# Patient Record
Sex: FEMALE | Race: WHITE | NOT HISPANIC OR LATINO
[De-identification: names, ages, dates, MRNs, and addresses within clinical notes are randomized per-mention and may not be internally consistent; named-entity substitution may affect disease eponyms.]

---

## 2017-04-27 ENCOUNTER — APPOINTMENT (OUTPATIENT)
Dept: HEMATOLOGY ONCOLOGY | Facility: CLINIC | Age: 82
End: 2017-04-27

## 2017-05-25 ENCOUNTER — EMERGENCY (EMERGENCY)
Facility: HOSPITAL | Age: 82
LOS: 0 days | Discharge: HOME | End: 2017-05-25

## 2017-06-28 DIAGNOSIS — I25.10 ATHEROSCLEROTIC HEART DISEASE OF NATIVE CORONARY ARTERY WITHOUT ANGINA PECTORIS: ICD-10-CM

## 2017-06-28 DIAGNOSIS — R06.02 SHORTNESS OF BREATH: ICD-10-CM

## 2017-06-28 DIAGNOSIS — R60.0 LOCALIZED EDEMA: ICD-10-CM

## 2017-06-28 DIAGNOSIS — I50.9 HEART FAILURE, UNSPECIFIED: ICD-10-CM

## 2017-06-28 DIAGNOSIS — Z88.0 ALLERGY STATUS TO PENICILLIN: ICD-10-CM

## 2017-06-28 DIAGNOSIS — Z88.1 ALLERGY STATUS TO OTHER ANTIBIOTIC AGENTS STATUS: ICD-10-CM

## 2017-06-28 DIAGNOSIS — I10 ESSENTIAL (PRIMARY) HYPERTENSION: ICD-10-CM

## 2017-06-28 DIAGNOSIS — Z88.5 ALLERGY STATUS TO NARCOTIC AGENT: ICD-10-CM

## 2017-06-28 DIAGNOSIS — Z90.710 ACQUIRED ABSENCE OF BOTH CERVIX AND UTERUS: ICD-10-CM

## 2017-06-28 DIAGNOSIS — Z79.01 LONG TERM (CURRENT) USE OF ANTICOAGULANTS: ICD-10-CM

## 2017-06-28 DIAGNOSIS — E03.9 HYPOTHYROIDISM, UNSPECIFIED: ICD-10-CM

## 2017-06-28 DIAGNOSIS — E11.9 TYPE 2 DIABETES MELLITUS WITHOUT COMPLICATIONS: ICD-10-CM

## 2017-09-15 ENCOUNTER — INPATIENT (INPATIENT)
Facility: HOSPITAL | Age: 82
LOS: 3 days | Discharge: HOME | End: 2017-09-19
Attending: INTERNAL MEDICINE

## 2017-09-15 DIAGNOSIS — N39.0 URINARY TRACT INFECTION, SITE NOT SPECIFIED: ICD-10-CM

## 2017-09-15 DIAGNOSIS — M31.6 OTHER GIANT CELL ARTERITIS: ICD-10-CM

## 2017-09-15 DIAGNOSIS — E11.9 TYPE 2 DIABETES MELLITUS WITHOUT COMPLICATIONS: ICD-10-CM

## 2017-09-15 DIAGNOSIS — E03.9 HYPOTHYROIDISM, UNSPECIFIED: ICD-10-CM

## 2017-09-15 DIAGNOSIS — T78.40XA ALLERGY, UNSPECIFIED, INITIAL ENCOUNTER: ICD-10-CM

## 2017-09-15 DIAGNOSIS — L03.90 CELLULITIS, UNSPECIFIED: ICD-10-CM

## 2017-09-15 DIAGNOSIS — I82.409 ACUTE EMBOLISM AND THROMBOSIS OF UNSPECIFIED DEEP VEINS OF UNSPECIFIED LOWER EXTREMITY: ICD-10-CM

## 2017-09-21 DIAGNOSIS — Z85.3 PERSONAL HISTORY OF MALIGNANT NEOPLASM OF BREAST: ICD-10-CM

## 2017-09-21 DIAGNOSIS — M31.6 OTHER GIANT CELL ARTERITIS: ICD-10-CM

## 2017-09-21 DIAGNOSIS — H54.62 UNQUALIFIED VISUAL LOSS, LEFT EYE, NORMAL VISION RIGHT EYE: ICD-10-CM

## 2017-09-21 DIAGNOSIS — Z94.7 CORNEAL TRANSPLANT STATUS: ICD-10-CM

## 2017-09-21 DIAGNOSIS — R26.9 UNSPECIFIED ABNORMALITIES OF GAIT AND MOBILITY: ICD-10-CM

## 2017-09-21 DIAGNOSIS — E11.21 TYPE 2 DIABETES MELLITUS WITH DIABETIC NEPHROPATHY: ICD-10-CM

## 2017-09-21 DIAGNOSIS — Z90.710 ACQUIRED ABSENCE OF BOTH CERVIX AND UTERUS: ICD-10-CM

## 2017-09-21 DIAGNOSIS — M19.90 UNSPECIFIED OSTEOARTHRITIS, UNSPECIFIED SITE: ICD-10-CM

## 2017-09-21 DIAGNOSIS — I25.2 OLD MYOCARDIAL INFARCTION: ICD-10-CM

## 2017-09-21 DIAGNOSIS — I25.10 ATHEROSCLEROTIC HEART DISEASE OF NATIVE CORONARY ARTERY WITHOUT ANGINA PECTORIS: ICD-10-CM

## 2017-09-21 DIAGNOSIS — Z88.2 ALLERGY STATUS TO SULFONAMIDES: ICD-10-CM

## 2017-09-21 DIAGNOSIS — I50.9 HEART FAILURE, UNSPECIFIED: ICD-10-CM

## 2017-09-21 DIAGNOSIS — I11.0 HYPERTENSIVE HEART DISEASE WITH HEART FAILURE: ICD-10-CM

## 2017-09-21 DIAGNOSIS — Z87.440 PERSONAL HISTORY OF URINARY (TRACT) INFECTIONS: ICD-10-CM

## 2017-09-21 DIAGNOSIS — Z79.01 LONG TERM (CURRENT) USE OF ANTICOAGULANTS: ICD-10-CM

## 2017-09-21 DIAGNOSIS — Z86.718 PERSONAL HISTORY OF OTHER VENOUS THROMBOSIS AND EMBOLISM: ICD-10-CM

## 2017-09-21 DIAGNOSIS — Z86.73 PERSONAL HISTORY OF TRANSIENT ISCHEMIC ATTACK (TIA), AND CEREBRAL INFARCTION WITHOUT RESIDUAL DEFICITS: ICD-10-CM

## 2017-09-21 DIAGNOSIS — E03.9 HYPOTHYROIDISM, UNSPECIFIED: ICD-10-CM

## 2017-09-21 DIAGNOSIS — Z88.0 ALLERGY STATUS TO PENICILLIN: ICD-10-CM

## 2017-09-21 DIAGNOSIS — I48.91 UNSPECIFIED ATRIAL FIBRILLATION: ICD-10-CM

## 2017-09-25 DIAGNOSIS — M31.6 OTHER GIANT CELL ARTERITIS: ICD-10-CM

## 2017-11-23 ENCOUNTER — INPATIENT (INPATIENT)
Facility: HOSPITAL | Age: 82
LOS: 7 days | Discharge: NURSING, REHAB OR LONG-TERM CARE | End: 2017-12-01
Attending: HOSPITALIST

## 2017-11-23 DIAGNOSIS — E03.9 HYPOTHYROIDISM, UNSPECIFIED: ICD-10-CM

## 2017-11-23 DIAGNOSIS — T78.40XA ALLERGY, UNSPECIFIED, INITIAL ENCOUNTER: ICD-10-CM

## 2017-11-23 DIAGNOSIS — N39.0 URINARY TRACT INFECTION, SITE NOT SPECIFIED: ICD-10-CM

## 2017-11-23 DIAGNOSIS — L03.90 CELLULITIS, UNSPECIFIED: ICD-10-CM

## 2017-11-23 DIAGNOSIS — M31.6 OTHER GIANT CELL ARTERITIS: ICD-10-CM

## 2017-11-23 DIAGNOSIS — I82.409 ACUTE EMBOLISM AND THROMBOSIS OF UNSPECIFIED DEEP VEINS OF UNSPECIFIED LOWER EXTREMITY: ICD-10-CM

## 2017-11-23 DIAGNOSIS — E11.9 TYPE 2 DIABETES MELLITUS WITHOUT COMPLICATIONS: ICD-10-CM

## 2017-12-01 ENCOUNTER — INPATIENT (INPATIENT)
Facility: HOSPITAL | Age: 82
LOS: 19 days | Discharge: HOME | End: 2017-12-21
Attending: PHYSICAL MEDICINE & REHABILITATION

## 2017-12-01 DIAGNOSIS — L03.90 CELLULITIS, UNSPECIFIED: ICD-10-CM

## 2017-12-01 DIAGNOSIS — E11.9 TYPE 2 DIABETES MELLITUS WITHOUT COMPLICATIONS: ICD-10-CM

## 2017-12-01 DIAGNOSIS — M31.6 OTHER GIANT CELL ARTERITIS: ICD-10-CM

## 2017-12-01 DIAGNOSIS — T78.40XA ALLERGY, UNSPECIFIED, INITIAL ENCOUNTER: ICD-10-CM

## 2017-12-01 DIAGNOSIS — I82.409 ACUTE EMBOLISM AND THROMBOSIS OF UNSPECIFIED DEEP VEINS OF UNSPECIFIED LOWER EXTREMITY: ICD-10-CM

## 2017-12-01 DIAGNOSIS — E03.9 HYPOTHYROIDISM, UNSPECIFIED: ICD-10-CM

## 2017-12-01 DIAGNOSIS — N39.0 URINARY TRACT INFECTION, SITE NOT SPECIFIED: ICD-10-CM

## 2017-12-05 DIAGNOSIS — I11.0 HYPERTENSIVE HEART DISEASE WITH HEART FAILURE: ICD-10-CM

## 2017-12-05 DIAGNOSIS — Z86.718 PERSONAL HISTORY OF OTHER VENOUS THROMBOSIS AND EMBOLISM: ICD-10-CM

## 2017-12-05 DIAGNOSIS — M19.90 UNSPECIFIED OSTEOARTHRITIS, UNSPECIFIED SITE: ICD-10-CM

## 2017-12-05 DIAGNOSIS — I48.91 UNSPECIFIED ATRIAL FIBRILLATION: ICD-10-CM

## 2017-12-05 DIAGNOSIS — Z79.01 LONG TERM (CURRENT) USE OF ANTICOAGULANTS: ICD-10-CM

## 2017-12-05 DIAGNOSIS — R79.1 ABNORMAL COAGULATION PROFILE: ICD-10-CM

## 2017-12-05 DIAGNOSIS — E11.59 TYPE 2 DIABETES MELLITUS WITH OTHER CIRCULATORY COMPLICATIONS: ICD-10-CM

## 2017-12-05 DIAGNOSIS — H91.90 UNSPECIFIED HEARING LOSS, UNSPECIFIED EAR: ICD-10-CM

## 2017-12-05 DIAGNOSIS — T50.2X5A ADVERSE EFFECT OF CARBONIC-ANHYDRASE INHIBITORS, BENZOTHIADIAZIDES AND OTHER DIURETICS, INITIAL ENCOUNTER: ICD-10-CM

## 2017-12-05 DIAGNOSIS — R21 RASH AND OTHER NONSPECIFIC SKIN ERUPTION: ICD-10-CM

## 2017-12-05 DIAGNOSIS — Z88.0 ALLERGY STATUS TO PENICILLIN: ICD-10-CM

## 2017-12-05 DIAGNOSIS — Z86.73 PERSONAL HISTORY OF TRANSIENT ISCHEMIC ATTACK (TIA), AND CEREBRAL INFARCTION WITHOUT RESIDUAL DEFICITS: ICD-10-CM

## 2017-12-05 DIAGNOSIS — E03.9 HYPOTHYROIDISM, UNSPECIFIED: ICD-10-CM

## 2017-12-05 DIAGNOSIS — Z87.440 PERSONAL HISTORY OF URINARY (TRACT) INFECTIONS: ICD-10-CM

## 2017-12-05 DIAGNOSIS — I50.32 CHRONIC DIASTOLIC (CONGESTIVE) HEART FAILURE: ICD-10-CM

## 2017-12-05 DIAGNOSIS — E11.40 TYPE 2 DIABETES MELLITUS WITH DIABETIC NEUROPATHY, UNSPECIFIED: ICD-10-CM

## 2017-12-05 DIAGNOSIS — Z85.3 PERSONAL HISTORY OF MALIGNANT NEOPLASM OF BREAST: ICD-10-CM

## 2017-12-05 DIAGNOSIS — I25.10 ATHEROSCLEROTIC HEART DISEASE OF NATIVE CORONARY ARTERY WITHOUT ANGINA PECTORIS: ICD-10-CM

## 2017-12-05 DIAGNOSIS — Z51.89 ENCOUNTER FOR OTHER SPECIFIED AFTERCARE: ICD-10-CM

## 2017-12-05 DIAGNOSIS — R19.7 DIARRHEA, UNSPECIFIED: ICD-10-CM

## 2017-12-05 DIAGNOSIS — L03.115 CELLULITIS OF RIGHT LOWER LIMB: ICD-10-CM

## 2017-12-05 DIAGNOSIS — M31.6 OTHER GIANT CELL ARTERITIS: ICD-10-CM

## 2017-12-05 DIAGNOSIS — L89.153 PRESSURE ULCER OF SACRAL REGION, STAGE 3: ICD-10-CM

## 2017-12-05 DIAGNOSIS — E83.42 HYPOMAGNESEMIA: ICD-10-CM

## 2017-12-05 DIAGNOSIS — E87.6 HYPOKALEMIA: ICD-10-CM

## 2017-12-05 DIAGNOSIS — Z90.710 ACQUIRED ABSENCE OF BOTH CERVIX AND UTERUS: ICD-10-CM

## 2017-12-05 DIAGNOSIS — Z79.84 LONG TERM (CURRENT) USE OF ORAL HYPOGLYCEMIC DRUGS: ICD-10-CM

## 2017-12-05 DIAGNOSIS — E87.3 ALKALOSIS: ICD-10-CM

## 2017-12-18 ENCOUNTER — APPOINTMENT (OUTPATIENT)
Dept: OTOLARYNGOLOGY | Facility: CLINIC | Age: 82
End: 2017-12-18

## 2017-12-26 ENCOUNTER — EMERGENCY (EMERGENCY)
Facility: HOSPITAL | Age: 82
LOS: 0 days | Discharge: HOME | End: 2017-12-27

## 2017-12-26 DIAGNOSIS — I50.9 HEART FAILURE, UNSPECIFIED: ICD-10-CM

## 2017-12-26 DIAGNOSIS — L03.90 CELLULITIS, UNSPECIFIED: ICD-10-CM

## 2017-12-26 DIAGNOSIS — Z88.5 ALLERGY STATUS TO NARCOTIC AGENT: ICD-10-CM

## 2017-12-26 DIAGNOSIS — D64.9 ANEMIA, UNSPECIFIED: ICD-10-CM

## 2017-12-26 DIAGNOSIS — R53.1 WEAKNESS: ICD-10-CM

## 2017-12-26 DIAGNOSIS — I48.91 UNSPECIFIED ATRIAL FIBRILLATION: ICD-10-CM

## 2017-12-26 DIAGNOSIS — I82.409 ACUTE EMBOLISM AND THROMBOSIS OF UNSPECIFIED DEEP VEINS OF UNSPECIFIED LOWER EXTREMITY: ICD-10-CM

## 2017-12-26 DIAGNOSIS — I25.2 OLD MYOCARDIAL INFARCTION: ICD-10-CM

## 2017-12-26 DIAGNOSIS — E03.9 HYPOTHYROIDISM, UNSPECIFIED: ICD-10-CM

## 2017-12-26 DIAGNOSIS — Z90.49 ACQUIRED ABSENCE OF OTHER SPECIFIED PARTS OF DIGESTIVE TRACT: ICD-10-CM

## 2017-12-26 DIAGNOSIS — T78.40XA ALLERGY, UNSPECIFIED, INITIAL ENCOUNTER: ICD-10-CM

## 2017-12-26 DIAGNOSIS — R05 COUGH: ICD-10-CM

## 2017-12-26 DIAGNOSIS — R30.0 DYSURIA: ICD-10-CM

## 2017-12-26 DIAGNOSIS — E11.9 TYPE 2 DIABETES MELLITUS WITHOUT COMPLICATIONS: ICD-10-CM

## 2017-12-26 DIAGNOSIS — Z79.899 OTHER LONG TERM (CURRENT) DRUG THERAPY: ICD-10-CM

## 2017-12-26 DIAGNOSIS — I11.0 HYPERTENSIVE HEART DISEASE WITH HEART FAILURE: ICD-10-CM

## 2017-12-26 DIAGNOSIS — M31.6 OTHER GIANT CELL ARTERITIS: ICD-10-CM

## 2017-12-26 DIAGNOSIS — N39.0 URINARY TRACT INFECTION, SITE NOT SPECIFIED: ICD-10-CM

## 2017-12-27 DIAGNOSIS — Z86.718 PERSONAL HISTORY OF OTHER VENOUS THROMBOSIS AND EMBOLISM: ICD-10-CM

## 2017-12-27 DIAGNOSIS — J11.1 INFLUENZA DUE TO UNIDENTIFIED INFLUENZA VIRUS WITH OTHER RESPIRATORY MANIFESTATIONS: ICD-10-CM

## 2017-12-27 DIAGNOSIS — T38.0X5D ADVERSE EFFECT OF GLUCOCORTICOIDS AND SYNTHETIC ANALOGUES, SUBSEQUENT ENCOUNTER: ICD-10-CM

## 2017-12-27 DIAGNOSIS — Z79.84 LONG TERM (CURRENT) USE OF ORAL HYPOGLYCEMIC DRUGS: ICD-10-CM

## 2017-12-27 DIAGNOSIS — G72.0 DRUG-INDUCED MYOPATHY: ICD-10-CM

## 2017-12-27 DIAGNOSIS — E03.9 HYPOTHYROIDISM, UNSPECIFIED: ICD-10-CM

## 2017-12-27 DIAGNOSIS — I48.91 UNSPECIFIED ATRIAL FIBRILLATION: ICD-10-CM

## 2017-12-27 DIAGNOSIS — Z87.440 PERSONAL HISTORY OF URINARY (TRACT) INFECTIONS: ICD-10-CM

## 2017-12-27 DIAGNOSIS — R53.81 OTHER MALAISE: ICD-10-CM

## 2017-12-27 DIAGNOSIS — Z90.10 ACQUIRED ABSENCE OF UNSPECIFIED BREAST AND NIPPLE: ICD-10-CM

## 2017-12-27 DIAGNOSIS — I25.10 ATHEROSCLEROTIC HEART DISEASE OF NATIVE CORONARY ARTERY WITHOUT ANGINA PECTORIS: ICD-10-CM

## 2017-12-27 DIAGNOSIS — Z86.73 PERSONAL HISTORY OF TRANSIENT ISCHEMIC ATTACK (TIA), AND CEREBRAL INFARCTION WITHOUT RESIDUAL DEFICITS: ICD-10-CM

## 2017-12-27 DIAGNOSIS — E11.40 TYPE 2 DIABETES MELLITUS WITH DIABETIC NEUROPATHY, UNSPECIFIED: ICD-10-CM

## 2017-12-27 DIAGNOSIS — I50.9 HEART FAILURE, UNSPECIFIED: ICD-10-CM

## 2017-12-27 DIAGNOSIS — B94.8 SEQUELAE OF OTHER SPECIFIED INFECTIOUS AND PARASITIC DISEASES: ICD-10-CM

## 2017-12-27 DIAGNOSIS — Z79.01 LONG TERM (CURRENT) USE OF ANTICOAGULANTS: ICD-10-CM

## 2017-12-27 DIAGNOSIS — I42.9 CARDIOMYOPATHY, UNSPECIFIED: ICD-10-CM

## 2017-12-27 DIAGNOSIS — N81.10 CYSTOCELE, UNSPECIFIED: ICD-10-CM

## 2017-12-27 DIAGNOSIS — L03.115 CELLULITIS OF RIGHT LOWER LIMB: ICD-10-CM

## 2017-12-27 DIAGNOSIS — M31.6 OTHER GIANT CELL ARTERITIS: ICD-10-CM

## 2017-12-27 DIAGNOSIS — R79.1 ABNORMAL COAGULATION PROFILE: ICD-10-CM

## 2017-12-27 DIAGNOSIS — D64.9 ANEMIA, UNSPECIFIED: ICD-10-CM

## 2017-12-27 DIAGNOSIS — Z85.3 PERSONAL HISTORY OF MALIGNANT NEOPLASM OF BREAST: ICD-10-CM

## 2017-12-27 DIAGNOSIS — Z79.52 LONG TERM (CURRENT) USE OF SYSTEMIC STEROIDS: ICD-10-CM

## 2018-01-18 ENCOUNTER — INPATIENT (INPATIENT)
Facility: HOSPITAL | Age: 83
LOS: 5 days | Discharge: HOME HEALTH | End: 2018-01-24
Attending: INTERNAL MEDICINE

## 2018-01-29 DIAGNOSIS — R53.81 OTHER MALAISE: ICD-10-CM

## 2018-01-29 DIAGNOSIS — B96.5 PSEUDOMONAS (AERUGINOSA) (MALLEI) (PSEUDOMALLEI) AS THE CAUSE OF DISEASES CLASSIFIED ELSEWHERE: ICD-10-CM

## 2018-01-29 DIAGNOSIS — E11.40 TYPE 2 DIABETES MELLITUS WITH DIABETIC NEUROPATHY, UNSPECIFIED: ICD-10-CM

## 2018-01-29 DIAGNOSIS — M31.6 OTHER GIANT CELL ARTERITIS: ICD-10-CM

## 2018-01-29 DIAGNOSIS — Z86.718 PERSONAL HISTORY OF OTHER VENOUS THROMBOSIS AND EMBOLISM: ICD-10-CM

## 2018-01-29 DIAGNOSIS — Z90.710 ACQUIRED ABSENCE OF BOTH CERVIX AND UTERUS: ICD-10-CM

## 2018-01-29 DIAGNOSIS — T45.515A ADVERSE EFFECT OF ANTICOAGULANTS, INITIAL ENCOUNTER: ICD-10-CM

## 2018-01-29 DIAGNOSIS — R15.9 FULL INCONTINENCE OF FECES: ICD-10-CM

## 2018-01-29 DIAGNOSIS — E87.6 HYPOKALEMIA: ICD-10-CM

## 2018-01-29 DIAGNOSIS — E03.9 HYPOTHYROIDISM, UNSPECIFIED: ICD-10-CM

## 2018-01-29 DIAGNOSIS — Z88.0 ALLERGY STATUS TO PENICILLIN: ICD-10-CM

## 2018-01-29 DIAGNOSIS — Z86.73 PERSONAL HISTORY OF TRANSIENT ISCHEMIC ATTACK (TIA), AND CEREBRAL INFARCTION WITHOUT RESIDUAL DEFICITS: ICD-10-CM

## 2018-01-29 DIAGNOSIS — Z85.3 PERSONAL HISTORY OF MALIGNANT NEOPLASM OF BREAST: ICD-10-CM

## 2018-01-29 DIAGNOSIS — N39.0 URINARY TRACT INFECTION, SITE NOT SPECIFIED: ICD-10-CM

## 2018-01-29 DIAGNOSIS — D64.9 ANEMIA, UNSPECIFIED: ICD-10-CM

## 2018-01-29 DIAGNOSIS — Z66 DO NOT RESUSCITATE: ICD-10-CM

## 2018-01-29 DIAGNOSIS — Z79.01 LONG TERM (CURRENT) USE OF ANTICOAGULANTS: ICD-10-CM

## 2018-01-29 DIAGNOSIS — I48.91 UNSPECIFIED ATRIAL FIBRILLATION: ICD-10-CM

## 2018-01-29 DIAGNOSIS — I25.10 ATHEROSCLEROTIC HEART DISEASE OF NATIVE CORONARY ARTERY WITHOUT ANGINA PECTORIS: ICD-10-CM

## 2018-01-29 DIAGNOSIS — I50.9 HEART FAILURE, UNSPECIFIED: ICD-10-CM

## 2018-01-29 DIAGNOSIS — Z87.440 PERSONAL HISTORY OF URINARY (TRACT) INFECTIONS: ICD-10-CM

## 2018-01-29 DIAGNOSIS — I25.2 OLD MYOCARDIAL INFARCTION: ICD-10-CM

## 2018-01-29 DIAGNOSIS — I11.0 HYPERTENSIVE HEART DISEASE WITH HEART FAILURE: ICD-10-CM

## 2018-01-29 DIAGNOSIS — D68.9 COAGULATION DEFECT, UNSPECIFIED: ICD-10-CM

## 2018-01-29 DIAGNOSIS — E11.59 TYPE 2 DIABETES MELLITUS WITH OTHER CIRCULATORY COMPLICATIONS: ICD-10-CM

## 2018-02-02 ENCOUNTER — INPATIENT (INPATIENT)
Facility: HOSPITAL | Age: 83
LOS: 6 days | Discharge: HOME HEALTH | End: 2018-02-09
Attending: HOSPITALIST

## 2018-02-03 VITALS — HEIGHT: 61 IN | WEIGHT: 165.35 LBS

## 2018-02-03 LAB
ANION GAP SERPL CALC-SCNC: 7 MMOL/L — SIGNIFICANT CHANGE UP (ref 7–14)
BUN SERPL-MCNC: 7 MG/DL — LOW (ref 10–20)
CALCIUM SERPL-MCNC: 7.1 MG/DL — LOW (ref 8.5–10.1)
CHLORIDE SERPL-SCNC: 102 MMOL/L — SIGNIFICANT CHANGE UP (ref 98–110)
CO2 SERPL-SCNC: 27 MMOL/L — SIGNIFICANT CHANGE UP (ref 17–32)
CREAT SERPL-MCNC: 0.6 MG/DL — LOW (ref 0.7–1.5)
GLUCOSE SERPL-MCNC: 137 MG/DL — HIGH (ref 70–110)
INR BLD: 2.41 RATIO — HIGH (ref 0.65–1.3)
POTASSIUM SERPL-MCNC: 3.2 MMOL/L — LOW (ref 3.5–5)
POTASSIUM SERPL-SCNC: 3.2 MMOL/L — LOW (ref 3.5–5)
PROTHROM AB SERPL-ACNC: 26.5 SEC — HIGH (ref 9.95–12.87)
SODIUM SERPL-SCNC: 136 MMOL/L — SIGNIFICANT CHANGE UP (ref 135–146)

## 2018-02-03 RX ORDER — INSULIN GLARGINE 100 [IU]/ML
10 INJECTION, SOLUTION SUBCUTANEOUS AT BEDTIME
Qty: 0 | Refills: 0 | Status: DISCONTINUED | OUTPATIENT
Start: 2018-02-03 | End: 2018-02-09

## 2018-02-03 RX ORDER — DEXTROSE 50 % IN WATER 50 %
1 SYRINGE (ML) INTRAVENOUS ONCE
Qty: 0 | Refills: 0 | Status: DISCONTINUED | OUTPATIENT
Start: 2018-02-03 | End: 2018-02-09

## 2018-02-03 RX ORDER — CEFEPIME 1 G/1
2000 INJECTION, POWDER, FOR SOLUTION INTRAMUSCULAR; INTRAVENOUS EVERY 12 HOURS
Qty: 0 | Refills: 0 | Status: DISCONTINUED | OUTPATIENT
Start: 2018-02-03 | End: 2018-02-03

## 2018-02-03 RX ORDER — CEFEPIME 1 G/1
2000 INJECTION, POWDER, FOR SOLUTION INTRAMUSCULAR; INTRAVENOUS EVERY 12 HOURS
Qty: 0 | Refills: 0 | Status: DISCONTINUED | OUTPATIENT
Start: 2018-02-03 | End: 2018-02-06

## 2018-02-03 RX ORDER — INSULIN LISPRO 100/ML
VIAL (ML) SUBCUTANEOUS
Qty: 0 | Refills: 0 | Status: DISCONTINUED | OUTPATIENT
Start: 2018-02-03 | End: 2018-02-09

## 2018-02-03 RX ORDER — DEXTROSE 50 % IN WATER 50 %
50 SYRINGE (ML) INTRAVENOUS ONCE
Qty: 0 | Refills: 0 | Status: DISCONTINUED | OUTPATIENT
Start: 2018-02-03 | End: 2018-02-09

## 2018-02-03 RX ORDER — PANTOPRAZOLE SODIUM 20 MG/1
40 TABLET, DELAYED RELEASE ORAL
Qty: 0 | Refills: 0 | Status: DISCONTINUED | OUTPATIENT
Start: 2018-02-03 | End: 2018-02-09

## 2018-02-03 RX ORDER — ACETAMINOPHEN 500 MG
650 TABLET ORAL EVERY 4 HOURS
Qty: 0 | Refills: 0 | Status: DISCONTINUED | OUTPATIENT
Start: 2018-02-03 | End: 2018-02-09

## 2018-02-03 RX ORDER — LEVOTHYROXINE SODIUM 125 MCG
25 TABLET ORAL DAILY
Qty: 0 | Refills: 0 | Status: DISCONTINUED | OUTPATIENT
Start: 2018-02-03 | End: 2018-02-09

## 2018-02-03 RX ORDER — GABAPENTIN 400 MG/1
100 CAPSULE ORAL DAILY
Qty: 0 | Refills: 0 | Status: DISCONTINUED | OUTPATIENT
Start: 2018-02-03 | End: 2018-02-09

## 2018-02-03 RX ORDER — CALCIUM CARBONATE 500(1250)
1 TABLET ORAL DAILY
Qty: 0 | Refills: 0 | Status: DISCONTINUED | OUTPATIENT
Start: 2018-02-03 | End: 2018-02-03

## 2018-02-03 RX ADMIN — CEFEPIME 100 MILLIGRAM(S): 1 INJECTION, POWDER, FOR SOLUTION INTRAMUSCULAR; INTRAVENOUS at 17:53

## 2018-02-03 RX ADMIN — GABAPENTIN 100 MILLIGRAM(S): 400 CAPSULE ORAL at 16:39

## 2018-02-04 DIAGNOSIS — E11.9 TYPE 2 DIABETES MELLITUS WITHOUT COMPLICATIONS: ICD-10-CM

## 2018-02-04 DIAGNOSIS — T78.40XA ALLERGY, UNSPECIFIED, INITIAL ENCOUNTER: ICD-10-CM

## 2018-02-04 DIAGNOSIS — L03.90 CELLULITIS, UNSPECIFIED: ICD-10-CM

## 2018-02-04 DIAGNOSIS — N39.0 URINARY TRACT INFECTION, SITE NOT SPECIFIED: ICD-10-CM

## 2018-02-04 DIAGNOSIS — I82.409 ACUTE EMBOLISM AND THROMBOSIS OF UNSPECIFIED DEEP VEINS OF UNSPECIFIED LOWER EXTREMITY: ICD-10-CM

## 2018-02-04 DIAGNOSIS — M31.6 OTHER GIANT CELL ARTERITIS: ICD-10-CM

## 2018-02-04 DIAGNOSIS — E03.9 HYPOTHYROIDISM, UNSPECIFIED: ICD-10-CM

## 2018-02-04 LAB
ANION GAP SERPL CALC-SCNC: 7 MMOL/L — SIGNIFICANT CHANGE UP (ref 7–14)
BUN SERPL-MCNC: 5 MG/DL — LOW (ref 10–20)
CALCIUM SERPL-MCNC: 7.3 MG/DL — LOW (ref 8.5–10.1)
CHLORIDE SERPL-SCNC: 104 MMOL/L — SIGNIFICANT CHANGE UP (ref 98–110)
CO2 SERPL-SCNC: 28 MMOL/L — SIGNIFICANT CHANGE UP (ref 17–32)
GLUCOSE SERPL-MCNC: 75 MG/DL — SIGNIFICANT CHANGE UP (ref 70–110)
HCT VFR BLD CALC: 24.6 % — LOW (ref 37–47)
HCT VFR BLD CALC: 25 % — LOW (ref 37–47)
HGB BLD-MCNC: 7.2 G/DL — CRITICAL LOW (ref 14–18)
HGB BLD-MCNC: 7.3 G/DL — CRITICAL LOW (ref 14–18)
INR BLD: 2.08 RATIO — HIGH (ref 0.65–1.3)
MCHC RBC-ENTMCNC: 24.3 PG — LOW (ref 27–31)
MCHC RBC-ENTMCNC: 24.7 PG — LOW (ref 27–31)
MCHC RBC-ENTMCNC: 29.2 G/DL — LOW (ref 32–37)
MCHC RBC-ENTMCNC: 29.3 G/DL — LOW (ref 32–37)
MCV RBC AUTO: 83.3 FL — SIGNIFICANT CHANGE UP (ref 81–91)
MCV RBC AUTO: 84.5 FL — SIGNIFICANT CHANGE UP (ref 81–91)
NRBC # BLD: 0 /100 WBCS — SIGNIFICANT CHANGE UP (ref 0–0)
NRBC # BLD: 0 /100 WBCS — SIGNIFICANT CHANGE UP (ref 0–0)
PLATELET # BLD AUTO: 368 K/UL — SIGNIFICANT CHANGE UP (ref 130–400)
PLATELET # BLD AUTO: 381 K/UL — SIGNIFICANT CHANGE UP (ref 130–400)
POTASSIUM SERPL-MCNC: 3.6 MMOL/L — SIGNIFICANT CHANGE UP (ref 3.5–5)
POTASSIUM SERPL-SCNC: 3.6 MMOL/L — SIGNIFICANT CHANGE UP (ref 3.5–5)
PROTHROM AB SERPL-ACNC: 22.8 SEC — HIGH (ref 9.95–12.87)
RBC # BLD: 2.91 M/UL — LOW (ref 4.2–5.4)
RBC # BLD: 3 M/UL — LOW (ref 4.2–5.4)
RBC # FLD: 16.2 % — HIGH (ref 11.5–14.5)
RBC # FLD: 16.3 % — HIGH (ref 11.5–14.5)
SODIUM SERPL-SCNC: 139 MMOL/L — SIGNIFICANT CHANGE UP (ref 135–146)
WBC # BLD: 5.01 K/UL — SIGNIFICANT CHANGE UP (ref 4.8–10.8)
WBC # BLD: 5.91 K/UL — SIGNIFICANT CHANGE UP (ref 4.8–10.8)
WBC # FLD AUTO: 5.01 K/UL — SIGNIFICANT CHANGE UP (ref 4.8–10.8)
WBC # FLD AUTO: 5.91 K/UL — SIGNIFICANT CHANGE UP (ref 4.8–10.8)

## 2018-02-04 RX ORDER — WARFARIN SODIUM 2.5 MG/1
2 TABLET ORAL ONCE
Qty: 0 | Refills: 0 | Status: COMPLETED | OUTPATIENT
Start: 2018-02-04 | End: 2018-02-04

## 2018-02-04 RX ORDER — ACETAMINOPHEN 500 MG
650 TABLET ORAL EVERY 6 HOURS
Qty: 0 | Refills: 0 | Status: DISCONTINUED | OUTPATIENT
Start: 2018-02-04 | End: 2018-02-09

## 2018-02-04 RX ORDER — POTASSIUM CHLORIDE 20 MEQ
20 PACKET (EA) ORAL ONCE
Qty: 0 | Refills: 0 | Status: COMPLETED | OUTPATIENT
Start: 2018-02-04 | End: 2018-02-04

## 2018-02-04 RX ADMIN — Medication 2.5 MILLIGRAM(S): at 05:27

## 2018-02-04 RX ADMIN — Medication 20 MILLIEQUIVALENT(S): at 11:07

## 2018-02-04 RX ADMIN — Medication 1 TABLET(S): at 11:02

## 2018-02-04 RX ADMIN — Medication 25 MICROGRAM(S): at 05:27

## 2018-02-04 RX ADMIN — CEFEPIME 100 MILLIGRAM(S): 1 INJECTION, POWDER, FOR SOLUTION INTRAMUSCULAR; INTRAVENOUS at 05:34

## 2018-02-04 RX ADMIN — WARFARIN SODIUM 2 MILLIGRAM(S): 2.5 TABLET ORAL at 21:28

## 2018-02-04 RX ADMIN — Medication 650 MILLIGRAM(S): at 18:08

## 2018-02-04 RX ADMIN — GABAPENTIN 100 MILLIGRAM(S): 400 CAPSULE ORAL at 11:02

## 2018-02-04 NOTE — PROGRESS NOTE ADULT - SUBJECTIVE AND OBJECTIVE BOX
RACIEL MILLIGAN [id number]    Hospitalist Note  89yFemale with PMHx Recurrent UTI, DVT on Coumadin, Temporal Arteritis, TIA, DMII with neuropathy, Lumpectomy, Bladder Prolapse with prior pessary, CAD status post MI, Hypothyroidism, and Breast CA status post lumpectomy admitted for dysuria and fevers secondary to recurrent UTI.    Overnight events/Updates: The pt developed acute on chronic anemia over the past 24 hours.  No melena or hematochezia was reported.  This was likely related to Coumadin toxicity.  Repeat CBC this afternoon confirmed persistent anemia.    Vital Signs Last 24 Hrs  T(C): 37.6 (04 Feb 2018 11:58), Max: 37.6 (04 Feb 2018 11:58)  T(F): 99.7 (04 Feb 2018 11:58), Max: 99.7 (04 Feb 2018 11:58)  HR: 96 (04 Feb 2018 11:58) (82 - 96)  BP: 99/46 (04 Feb 2018 11:58) (99/46 - 113/49)   BP(mean): --  RR: 18 (04 Feb 2018 05:39) (18 - 18)  SpO2: --    Physical Examination:  General: AAO x3   HEENT: PERRLA, EOMI  CV= S1 & S2 appreciated  Lungs=CTA BL  Abdominal Examination= + BS Soft NT/ND  Stage II decubitus ulcer to the sacrum  Extremity Examination= No C/C/E    ROS: No chest pain, no shortness of breath.  All other systems reviewed and are wtihin normal limits except for the complaints in the HPI.    MEDICATIONS  (STANDING):  calcium carbonate 1250 mG + Vitamin D (OsCal 500 + D) 1 Tablet(s) Oral daily  cefepime  IVPB 2000 milliGRAM(s) IV Intermittent every 12 hours  gabapentin 100 milliGRAM(s) Oral daily  insulin glargine Injectable (LANTUS) 10 Unit(s) SubCutaneous at bedtime  insulin lispro (HumaLOG) corrective regimen sliding scale   SubCutaneous three times a day before meals  levothyroxine 25 MICROGram(s) Oral daily  pantoprazole    Tablet 40 milliGRAM(s) Oral before breakfast  predniSONE   Tablet 2.5 milliGRAM(s) Oral daily    MEDICATIONS  (PRN):  acetaminophen   Tablet 650 milliGRAM(s) Oral every 4 hours PRN For Temp greater than 38.3 C (101F)  dextrose 50% Injectable 50 milliLiter(s) IV Push once PRN Blood Glucose at or below 70mg/dl and pt cannot take po - give stat and notify MD after giving medication for hypoglycemia  dextrose Gel 1 Dose(s) Oral once PRN Blood Glucose LESS THAN 70 milliGRAM(s)/deciLiter                            7.3    5.91  )-----------( 381      ( 04 Feb 2018 11:44 )             25.0     02-04    139  |  104  |  5<L>  ----------------------------<  75  3.6   |  28  |  x     Ca    7.3<L>      04 Feb 2018 05:15        Case discussed with housestaff & family  CARLOS Angel [id number]

## 2018-02-04 NOTE — PROGRESS NOTE ADULT - ASSESSMENT
89yo Female with PMHx Recurrent UTI, DVT on Coumadin, Temporal Arteritis, TIA, DMII with neuropathy, Lumpectomy, Bladder Prolapse with prior pessary, CAD status post MI, Hypothyroidism, and Breast CA status post lumpectomy admitted for dysuria and fevers secondary to recurrent UTI.    1) Recurrent UTI: pt suffered recent allergy to Cipro.  Avoid fluoroquinolones.  She has been tolerating Cefepime without complaint.  No fevers reported.  Follow-up results from repeat urine and blood cultures.  Previous urine cultures were positive for sensitive Klebsiella.  2) Hx of Bladder Prolapse: uro-gynecology consult requested for history of bladder prolapse and recurrent urinary tract infections.  3) DVT on Coumadin/Coumadin Toxicity: complicated by acute blood loss anemia.  Repeat Hgb stable @ 7.3.  Type and screen ordered.  Transfuse 1u PRBC and repeat CBC for tomorrow morning.  4) Temporal Arteritis: continue Prednisone 2.5mg PO q24  5) DMII: continue Lantus/lIspro as directed.  Monitor FS with meals.  For peripheral neuropathy, continue Neurontin.  6) Stage II: continue local wound care as per nursing  6) GI/DVT prophylaxis

## 2018-02-05 DIAGNOSIS — Z01.89 ENCOUNTER FOR OTHER SPECIFIED SPECIAL EXAMINATIONS: ICD-10-CM

## 2018-02-05 DIAGNOSIS — D50.0 IRON DEFICIENCY ANEMIA SECONDARY TO BLOOD LOSS (CHRONIC): ICD-10-CM

## 2018-02-05 DIAGNOSIS — N39.0 URINARY TRACT INFECTION, SITE NOT SPECIFIED: ICD-10-CM

## 2018-02-05 DIAGNOSIS — N81.10 CYSTOCELE, UNSPECIFIED: ICD-10-CM

## 2018-02-05 DIAGNOSIS — T45.511S: ICD-10-CM

## 2018-02-05 DIAGNOSIS — M31.6 OTHER GIANT CELL ARTERITIS: ICD-10-CM

## 2018-02-05 LAB
ANION GAP SERPL CALC-SCNC: 10 MMOL/L — SIGNIFICANT CHANGE UP (ref 7–14)
ANION GAP SERPL CALC-SCNC: 3 MMOL/L — LOW (ref 7–14)
BUN SERPL-MCNC: 6 MG/DL — LOW (ref 10–20)
BUN SERPL-MCNC: 6 MG/DL — LOW (ref 10–20)
CALCIUM SERPL-MCNC: 7.4 MG/DL — LOW (ref 8.5–10.1)
CALCIUM SERPL-MCNC: 7.6 MG/DL — LOW (ref 8.5–10.1)
CHLORIDE SERPL-SCNC: 104 MMOL/L — SIGNIFICANT CHANGE UP (ref 98–110)
CHLORIDE SERPL-SCNC: 99 MMOL/L — SIGNIFICANT CHANGE UP (ref 98–110)
CO2 SERPL-SCNC: 28 MMOL/L — SIGNIFICANT CHANGE UP (ref 17–32)
CO2 SERPL-SCNC: 31 MMOL/L — SIGNIFICANT CHANGE UP (ref 17–32)
CREAT SERPL-MCNC: 0.4 MG/DL — LOW (ref 0.7–1.5)
CREAT SERPL-MCNC: 0.5 MG/DL — LOW (ref 0.7–1.5)
GLUCOSE SERPL-MCNC: 132 MG/DL — HIGH (ref 70–110)
GLUCOSE SERPL-MCNC: 71 MG/DL — SIGNIFICANT CHANGE UP (ref 70–110)
HCT VFR BLD CALC: 29.6 % — LOW (ref 37–47)
HGB BLD-MCNC: 9.3 G/DL — LOW (ref 14–18)
INR BLD: 1.67 RATIO — HIGH (ref 0.65–1.3)
MCHC RBC-ENTMCNC: 25.7 PG — LOW (ref 27–31)
MCHC RBC-ENTMCNC: 31.4 G/DL — LOW (ref 32–37)
MCV RBC AUTO: 81.8 FL — SIGNIFICANT CHANGE UP (ref 81–91)
NRBC # BLD: 0 /100 WBCS — SIGNIFICANT CHANGE UP (ref 0–0)
PLATELET # BLD AUTO: 377 K/UL — SIGNIFICANT CHANGE UP (ref 130–400)
POTASSIUM SERPL-MCNC: 3.6 MMOL/L — SIGNIFICANT CHANGE UP (ref 3.5–5)
POTASSIUM SERPL-MCNC: 3.6 MMOL/L — SIGNIFICANT CHANGE UP (ref 3.5–5)
POTASSIUM SERPL-SCNC: 3.6 MMOL/L — SIGNIFICANT CHANGE UP (ref 3.5–5)
POTASSIUM SERPL-SCNC: 3.6 MMOL/L — SIGNIFICANT CHANGE UP (ref 3.5–5)
PROTHROM AB SERPL-ACNC: 18.2 SEC — HIGH (ref 9.95–12.87)
RBC # BLD: 3.62 M/UL — LOW (ref 4.2–5.4)
RBC # FLD: 15.9 % — HIGH (ref 11.5–14.5)
SODIUM SERPL-SCNC: 137 MMOL/L — SIGNIFICANT CHANGE UP (ref 135–146)
SODIUM SERPL-SCNC: 138 MMOL/L — SIGNIFICANT CHANGE UP (ref 135–146)
WBC # BLD: 5.19 K/UL — SIGNIFICANT CHANGE UP (ref 4.8–10.8)
WBC # FLD AUTO: 5.19 K/UL — SIGNIFICANT CHANGE UP (ref 4.8–10.8)

## 2018-02-05 RX ORDER — WARFARIN SODIUM 2.5 MG/1
2 TABLET ORAL ONCE
Qty: 0 | Refills: 0 | Status: DISCONTINUED | OUTPATIENT
Start: 2018-02-05 | End: 2018-02-05

## 2018-02-05 RX ORDER — WARFARIN SODIUM 2.5 MG/1
2.5 TABLET ORAL ONCE
Qty: 0 | Refills: 0 | Status: COMPLETED | OUTPATIENT
Start: 2018-02-05 | End: 2018-02-05

## 2018-02-05 RX ADMIN — Medication 1 TABLET(S): at 13:15

## 2018-02-05 RX ADMIN — GABAPENTIN 100 MILLIGRAM(S): 400 CAPSULE ORAL at 13:15

## 2018-02-05 RX ADMIN — Medication 650 MILLIGRAM(S): at 20:46

## 2018-02-05 RX ADMIN — CEFEPIME 100 MILLIGRAM(S): 1 INJECTION, POWDER, FOR SOLUTION INTRAMUSCULAR; INTRAVENOUS at 18:22

## 2018-02-05 RX ADMIN — CEFEPIME 100 MILLIGRAM(S): 1 INJECTION, POWDER, FOR SOLUTION INTRAMUSCULAR; INTRAVENOUS at 05:15

## 2018-02-05 RX ADMIN — WARFARIN SODIUM 2.5 MILLIGRAM(S): 2.5 TABLET ORAL at 23:20

## 2018-02-05 RX ADMIN — Medication 25 MICROGRAM(S): at 05:15

## 2018-02-05 RX ADMIN — Medication 2.5 MILLIGRAM(S): at 05:15

## 2018-02-05 NOTE — DIETITIAN INITIAL EVALUATION ADULT. - ENERGY NEEDS
Estimated Calorie Needs: 3407-9771 kcal/day (30-35 kcal/kg IBW)   Estimated Protein Needs: 55-68 gm.day (1.2-1.5 gm/alex IBW)  Estimated Fluid Needs: 1ml/kcal

## 2018-02-05 NOTE — CONSULT NOTE ADULT - SUBJECTIVE AND OBJECTIVE BOX
HPI: 89 yo F here for weakness and dysuria; hospitalized [facility] 2/2/18. Recent hospitalization for UTI and then subsequent inpatient rehab on Dr. Glover's service. She made some improvement progressing to walk 60 feet with a walker. She was treated for the flu. She is hospitalized once again with a UTI. She is deconditioned and weak. her son mentions that her mom is too weak to try PT today. Today may be appropriate.      PAST MEDICAL & SURGICAL HISTORY:  afib, DVT on coumadin, CAD, MI, UTI-reccurrent, CAD, MI, breast ca, bladder prolapse, left cornea transplant    Hospital Course:    TODAY'S SUBJECTIVE & REVIEW OF SYMPTOMS:     Constitutional WNL   Cardio WNL   Resp WNL   GI WNL  Heme WNL  Endo WNL  Skin WNL  MSK WNL  Neuro WNL  Cognitive-mild cognitive impairment  Psych WNL      MEDICATIONS  (STANDING):  calcium carbonate 1250 mG + Vitamin D (OsCal 500 + D) 1 Tablet(s) Oral daily  cefepime  IVPB 2000 milliGRAM(s) IV Intermittent every 12 hours  gabapentin 100 milliGRAM(s) Oral daily  insulin glargine Injectable (LANTUS) 10 Unit(s) SubCutaneous at bedtime  insulin lispro (HumaLOG) corrective regimen sliding scale   SubCutaneous three times a day before meals  levothyroxine 25 MICROGram(s) Oral daily  pantoprazole    Tablet 40 milliGRAM(s) Oral before breakfast  predniSONE   Tablet 2.5 milliGRAM(s) Oral daily  warfarin 2.5 milliGRAM(s) Oral once    MEDICATIONS  (PRN):  acetaminophen   Tablet 650 milliGRAM(s) Oral every 4 hours PRN For Temp greater than 38.3 C (101F)  acetaminophen   Tablet. 650 milliGRAM(s) Oral every 6 hours PRN Mild Pain (1 - 3)  dextrose 50% Injectable 50 milliLiter(s) IV Push once PRN Blood Glucose at or below 70mg/dl and pt cannot take po - give stat and notify MD after giving medication for hypoglycemia  dextrose Gel 1 Dose(s) Oral once PRN Blood Glucose LESS THAN 70 milliGRAM(s)/deciLiter      FAMILY HISTORY:      Allergie: sMultiple antibiotic allergies    Bactrim (Unknown)  Cipro (Unknown)  fluoroquinolone antibiotics (Other (Severe))  methenamine (Unknown)  nitrofurantoin (Unknown)  sulfa drugs (Unknown)    Intolerances        SOCIAL HISTORY:    [  ] Etoh  [  ] Smoking  [  ] Substance abuse     Home Environment:  [  ] Home Alone  [x  ] Lives with Family-son  [  ] Home Health Aid    Dwelling:  [  ] Apartment  [  ] Private House  [  ] Adult Home  [  ] Skilled Nursing Facility      [  ] Short Term  [  ] Long Term  [  ] Stairs       Elevator [  ]    FUNCTIONAL STATUS PTA: (Check all that apply)  Ambulation: [   ]Independent    [  ] Dependent     [  ] Non-Ambulatory  Assistive Device: [  ] SA Cane  [  ]  Q Cane  [  ] Walker  [  ]  Wheelchair  ADL : [  ] Independent  [  ]  Dependent       Vital Signs Last 24 Hrs  T(C): 36.7 (05 Feb 2018 12:29), Max: 36.9 (04 Feb 2018 21:54)  T(F): 98.1 (05 Feb 2018 12:29), Max: 98.5 (04 Feb 2018 21:54)  HR: 90 (05 Feb 2018 12:29) (80 - 90)  BP: 130/58 (05 Feb 2018 12:29) (126/59 - 132/60)  BP(mean): --  RR: 18 (05 Feb 2018 12:29) (18 - 18)  SpO2: --      PHYSICAL EXAM: Alert & Oriented X3  GENERAL: NAD, well-groomed, well-developed  HEAD:  Atraumatic, Normocephalic  EYES: EOMI, PERRLA, conjunctiva and sclera clear  NECK: Supple, No JVD, Normal thyroid  CHEST/LUNG: Clear to percussion bilaterally; No rales, rhonchi, wheezing, or rubs  HEART: Regular rate and rhythm; No murmurs, rubs, or gallops  ABDOMEN: Soft, Nontender, Nondistended; Bowel sounds present  EXTREMITIES:  2+ Peripheral Pulses, No clubbing, cyanosis, or edema    NERVOUS SYSTEM:  Cranial Nerves 2-12 intact [  ] Abnormal  [  ]  ROM: WFL all extremities [X  ]  Abnormal [  ]  Motor Strength: WFL all extremities  [  ]  Abnormal [x] LE's power 4 to 4=/5  Sensation: intact to light touch [x  ] Abnormal [  ]  Reflexes: Symmetric [  ]  Abnormal [  ]    FUNCTIONAL STATUS:  Bed Mobility: Independent [  ]  Supervision [  ]  Needs Assistance [  ]  N/A [  ]  Transfers: Independent [  ]  Supervision [  ]  Needs Assistance [ x ]  N/A [  ]   Ambulation: Independent [  ]  Supervision [  ]  Needs Assistance [x  ]  N/A [  ]  ADL: Independent [  ] Requires Assistance [  ] N/A [  ]      LABS:                        9.3    5.19  )-----------( 377      ( 05 Feb 2018 07:20 )             29.6     02-05    137  |  99  |  6<L>  ----------------------------<  71  3.6   |  28  |  0.4<L>    Ca    7.4<L>      05 Feb 2018 07:20      PT/INR - ( 05 Feb 2018 07:20 )   PT: 18.20 sec;   INR: 1.67 ratio               RADIOLOGY & ADDITIONAL STUDIES:    Assesment:

## 2018-02-05 NOTE — PROGRESS NOTE ADULT - SUBJECTIVE AND OBJECTIVE BOX
RACIEL MILLIGAN [id number]    Hospitalist Note  89yFemale with PMHx Recurrent UTI, DVT on Coumadin, Temporal Arteritis, TIA, DMII with neuropathy, Lumpectomy, Bladder Prolapse with prior pessary, CAD status post MI, Hypothyroidism, and Breast CA status post lumpectomy admitted for dysuria and fevers secondary to recurrent UTI.    Overnight events/Updates:    Vital Signs Last 24 Hrs  T(C): 36.7 (05 Feb 2018 12:29), Max: 36.9 (04 Feb 2018 21:54)  T(F): 98.1 (05 Feb 2018 12:29), Max: 98.5 (04 Feb 2018 21:54)  HR: 90 (05 Feb 2018 12:29) (80 - 90)  BP: 130/58 (05 Feb 2018 12:29) (126/59 - 132/60)  BP(mean): --  RR: 18 (05 Feb 2018 12:29) (18 - 18)  SpO2: --    Physical Examination:  General: AAO x3   HEENT: PERRLA, EOMI, hard of hearing  CV= S1 & S2 appreciated  Lungs= CTA BL  Abdominal Examination= + BS, Soft, NT/ND  Extremity Examination= No C/C/E    ROS: No chest pain, no shortness of breath.  All other systems reviewed and are within normal limits except for the complaints in the HPI.    MEDICATIONS  (STANDING):  calcium carbonate 1250 mG + Vitamin D (OsCal 500 + D) 1 Tablet(s) Oral daily  cefepime  IVPB 2000 milliGRAM(s) IV Intermittent every 12 hours  gabapentin 100 milliGRAM(s) Oral daily  insulin glargine Injectable (LANTUS) 10 Unit(s) SubCutaneous at bedtime  insulin lispro (HumaLOG) corrective regimen sliding scale   SubCutaneous three times a day before meals  levothyroxine 25 MICROGram(s) Oral daily  pantoprazole    Tablet 40 milliGRAM(s) Oral before breakfast  predniSONE   Tablet 2.5 milliGRAM(s) Oral daily  warfarin 2 milliGRAM(s) Oral once    MEDICATIONS  (PRN):  acetaminophen   Tablet 650 milliGRAM(s) Oral every 4 hours PRN For Temp greater than 38.3 C (101F)  acetaminophen   Tablet. 650 milliGRAM(s) Oral every 6 hours PRN Mild Pain (1 - 3)  dextrose 50% Injectable 50 milliLiter(s) IV Push once PRN Blood Glucose at or below 70mg/dl and pt cannot take po - give stat and notify MD after giving medication for hypoglycemia  dextrose Gel 1 Dose(s) Oral once PRN Blood Glucose LESS THAN 70 milliGRAM(s)/deciLiter                            9.3    5.19  )-----------( 377      ( 05 Feb 2018 07:20 )             29.6     02-05    137  |  99  |  6<L>  ----------------------------<  71  3.6   |  28  |  0.4<L>    Ca    7.4<L>      05 Feb 2018 07:20    Urine CX: (1/18/18): Pseudomonas (sensitive to Cephalosporins)    Case discussed with housestaff & family  CARLOS Angel [id number] RACIEL MILLIGAN [id number]    Hospitalist Note  89yFemale with PMHx Recurrent UTI, DVT on Coumadin, Temporal Arteritis, TIA, DMII with neuropathy, Lumpectomy, Bladder Prolapse with prior pessary, CAD status post MI, Hypothyroidism, and Breast CA status post lumpectomy admitted for dysuria and fevers secondary to recurrent UTI.    Overnight events/Updates: her Hgb has rebounded to >9 following transfusion.  The pt appears comfortable upon examination this afternoon.  We are still awaiting a uro-gynecology evaluation.    Vital Signs Last 24 Hrs  T(C): 36.7 (05 Feb 2018 12:29), Max: 36.9 (04 Feb 2018 21:54)  T(F): 98.1 (05 Feb 2018 12:29), Max: 98.5 (04 Feb 2018 21:54)  HR: 90 (05 Feb 2018 12:29) (80 - 90)  BP: 130/58 (05 Feb 2018 12:29) (126/59 - 132/60)  BP(mean): --  RR: 18 (05 Feb 2018 12:29) (18 - 18)  SpO2: --    Physical Examination:  General: AAO x3   HEENT: PERRLA, EOMI, hard of hearing  CV= S1 & S2 appreciated  Lungs= CTA BL  Abdominal Examination= Obese, + BS, Soft, NT/ND  Extremity Examination= No C/C/E    ROS: No chest pain, no shortness of breath.  Pt reports increased discharge from her eyes  All other systems reviewed and are within normal limits except for the complaints in the HPI.    MEDICATIONS  (STANDING):  calcium carbonate 1250 mG + Vitamin D (OsCal 500 + D) 1 Tablet(s) Oral daily  cefepime  IVPB 2000 milliGRAM(s) IV Intermittent every 12 hours  gabapentin 100 milliGRAM(s) Oral daily  insulin glargine Injectable (LANTUS) 10 Unit(s) SubCutaneous at bedtime  insulin lispro (HumaLOG) corrective regimen sliding scale   SubCutaneous three times a day before meals  levothyroxine 25 MICROGram(s) Oral daily  pantoprazole    Tablet 40 milliGRAM(s) Oral before breakfast  predniSONE   Tablet 2.5 milliGRAM(s) Oral daily  warfarin 2 milliGRAM(s) Oral once    MEDICATIONS  (PRN):  acetaminophen   Tablet 650 milliGRAM(s) Oral every 4 hours PRN For Temp greater than 38.3 C (101F)  acetaminophen   Tablet. 650 milliGRAM(s) Oral every 6 hours PRN Mild Pain (1 - 3)  dextrose 50% Injectable 50 milliLiter(s) IV Push once PRN Blood Glucose at or below 70mg/dl and pt cannot take po - give stat and notify MD after giving medication for hypoglycemia  dextrose Gel 1 Dose(s) Oral once PRN Blood Glucose LESS THAN 70 milliGRAM(s)/deciLiter                            9.3    5.19  )-----------( 377      ( 05 Feb 2018 07:20 )             29.6     02-05    137  |  99  |  6<L>  ----------------------------<  71  3.6   |  28  |  0.4<L>    Ca    7.4<L>      05 Feb 2018 07:20    Urine CX: (1/18/18): Pseudomonas (sensitive to Cephalosporins)    Case discussed with housesta & family  CARLOS Angel [id number]

## 2018-02-05 NOTE — PROGRESS NOTE ADULT - PROBLEM SELECTOR PLAN 1
Pseudo (sensitive to Cephalosporins; allergy to Cipro).  Cefepime IV. Switch to Cefpodoxime 200 bid after d/c.

## 2018-02-05 NOTE — DIETITIAN INITIAL EVALUATION ADULT. - OTHER INFO
Pt with PMHx of recurrent UTI, DVT, temporal arthritis, TIA, DM II w/ neuropathy, bladder prolapse, CAD s/p MI, hypothyroidism, breast ca. Pt admitted for treatment of UTI and developed acute on chronic anemia over past 24 hrs.

## 2018-02-05 NOTE — CONSULT NOTE ADULT - SUBJECTIVE AND OBJECTIVE BOX
HPI:      PAST MEDICAL & SURGICAL HISTORY:      Hospital Course:    TODAY'S SUBJECTIVE & REVIEW OF SYMPTOMS:     Constitutional WNL   Cardio WNL   Resp WNL   GI WNL  Heme WNL  Endo WNL  Skin WNL  MSK WNL  Neuro WNL  Cognitive WNL  Psych WNL      MEDICATIONS  (STANDING):  calcium carbonate 1250 mG + Vitamin D (OsCal 500 + D) 1 Tablet(s) Oral daily  cefepime  IVPB 2000 milliGRAM(s) IV Intermittent every 12 hours  gabapentin 100 milliGRAM(s) Oral daily  insulin glargine Injectable (LANTUS) 10 Unit(s) SubCutaneous at bedtime  insulin lispro (HumaLOG) corrective regimen sliding scale   SubCutaneous three times a day before meals  levothyroxine 25 MICROGram(s) Oral daily  pantoprazole    Tablet 40 milliGRAM(s) Oral before breakfast  predniSONE   Tablet 2.5 milliGRAM(s) Oral daily    MEDICATIONS  (PRN):  acetaminophen   Tablet 650 milliGRAM(s) Oral every 4 hours PRN For Temp greater than 38.3 C (101F)  acetaminophen   Tablet. 650 milliGRAM(s) Oral every 6 hours PRN Mild Pain (1 - 3)  dextrose 50% Injectable 50 milliLiter(s) IV Push once PRN Blood Glucose at or below 70mg/dl and pt cannot take po - give stat and notify MD after giving medication for hypoglycemia  dextrose Gel 1 Dose(s) Oral once PRN Blood Glucose LESS THAN 70 milliGRAM(s)/deciLiter      FAMILY HISTORY:      Allergies    Bactrim (Unknown)  Cipro (Unknown)  fluoroquinolone antibiotics (Other (Severe))  methenamine (Unknown)  nitrofurantoin (Unknown)  sulfa drugs (Unknown)    Intolerances        SOCIAL HISTORY:    [  ] Etoh  [  ] Smoking  [  ] Substance abuse     Home Environment:  [  ] Home Alone  [  ] Lives with Family  [  ] Home Health Aid    Dwelling:  [  ] Apartment  [  ] Private House  [  ] Adult Home  [  ] Skilled Nursing Facility      [  ] Short Term  [  ] Long Term  [  ] Stairs       Elevator [  ]    FUNCTIONAL STATUS PTA: (Check all that apply)  Ambulation: [   ]Independent    [  ] Dependent     [  ] Non-Ambulatory  Assistive Device: [  ] SA Cane  [  ]  Q Cane  [  ] Walker  [  ]  Wheelchair  ADL : [  ] Independent  [  ]  Dependent       Vital Signs Last 24 Hrs  T(C): 36.7 (05 Feb 2018 12:29), Max: 36.9 (04 Feb 2018 21:54)  T(F): 98.1 (05 Feb 2018 12:29), Max: 98.5 (04 Feb 2018 21:54)  HR: 90 (05 Feb 2018 12:29) (80 - 90)  BP: 130/58 (05 Feb 2018 12:29) (126/59 - 132/60)  BP(mean): --  RR: 18 (05 Feb 2018 12:29) (18 - 18)  SpO2: --      PHYSICAL EXAM: Alert & Oriented X3  GENERAL: NAD, well-groomed, well-developed  HEAD:  Atraumatic, Normocephalic  EYES: EOMI, PERRLA, conjunctiva and sclera clear  NECK: Supple, No JVD, Normal thyroid  CHEST/LUNG: Clear to percussion bilaterally; No rales, rhonchi, wheezing, or rubs  HEART: Regular rate and rhythm; No murmurs, rubs, or gallops  ABDOMEN: Soft, Nontender, Nondistended; Bowel sounds present  EXTREMITIES:  2+ Peripheral Pulses, No clubbing, cyanosis, or edema    NERVOUS SYSTEM:  Cranial Nerves 2-12 intact [  ] Abnormal  [  ]  ROM: WFL all extremities [  ]  Abnormal [  ]  Motor Strength: WFL all extremities  [  ]  Abnormal [  ]  Sensation: intact to light touch [  ] Abnormal [  ]  Reflexes: Symmetric [  ]  Abnormal [  ]    FUNCTIONAL STATUS:  Bed Mobility: Independent [  ]  Supervision [  ]  Needs Assistance [  ]  N/A [  ]  Transfers: Independent [  ]  Supervision [  ]  Needs Assistance [  ]  N/A [  ]   Ambulation: Independent [  ]  Supervision [  ]  Needs Assistance [  ]  N/A [  ]  ADL: Independent [  ] Requires Assistance [  ] N/A [  ]      LABS:                        9.3    5.19  )-----------( 377      ( 05 Feb 2018 07:20 )             29.6     02-05    137  |  99  |  6<L>  ----------------------------<  71  3.6   |  28  |  0.4<L>    Ca    7.4<L>      05 Feb 2018 07:20      PT/INR - ( 05 Feb 2018 07:20 )   PT: 18.20 sec;   INR: 1.67 ratio               RADIOLOGY & ADDITIONAL STUDIES:    Assesment:

## 2018-02-05 NOTE — PROGRESS NOTE ADULT - ASSESSMENT
89yo Female with PMHx Recurrent UTI, DVT on Coumadin, Temporal Arteritis, TIA, DMII with neuropathy, Lumpectomy, Bladder Prolapse with prior pessary, CAD status post MI, Hypothyroidism, and Breast CA status post lumpectomy admitted for dysuria and fevers secondary to recurrent UTI.    1) Recurrent UTI: pt suffered recent allergy to Cipro.  Continue IV Cefepime as inpatient and then transition to Vantin 200mg PO q12 upon discharge.  Previous urine culture results are noted above.  2) Hx of Bladder Prolapse: RECALL UROGYNECOLOGY for history of bladder prolapse and recurrent urinary tract infections.  3) DVT on Coumadin/Coumadin Toxicity: complicated by acute blood loss anemia.  Hgb improved to 9.3 following transfusion.  INR is currently subtherapeutic.  Restart Coumadin 2.5mg at bedtime.  Repeat coagulation studies tomorrow morning.  4) Temporal Arteritis: continue Prednisone 2.5mg PO q24  5) DMII: continue Lantus/lIspro as directed.  Monitor FS with meals.  For peripheral neuropathy, continue Neurontin.  6) Stage II: continue local wound care as per nursing  7) GI/DVT prophylaxis    PT/Rehab

## 2018-02-05 NOTE — DIETITIAN INITIAL EVALUATION ADULT. - FACTORS AFF FOOD INTAKE
other (specify)/Per pt's son, prednisone causes nausea and decreased appetite occasionally. Pt is currently consuming 50-75% of all meals.

## 2018-02-05 NOTE — PROGRESS NOTE ADULT - SUBJECTIVE AND OBJECTIVE BOX
RACIEL MILLIGAN [id number]    89yFemale with PMHx Recurrent UTI, DVT on Coumadin, Temporal Arteritis, TIA, DMII with neuropathy, Lumpectomy, Bladder Prolapse with prior pessary, CAD status post MI, Hypothyroidism, and Breast CA status post lumpectomy admitted for dysuria and fevers secondary to recurrent UTI.    Hemodynamically stable and asymptomatic. We are still awaiting a uro-gynecology evaluation.    Vital Signs Last 24 Hrs  T(C): 36.7 (05 Feb 2018 12:29), Max: 36.9 (04 Feb 2018 21:54)  T(F): 98.1 (05 Feb 2018 12:29), Max: 98.5 (04 Feb 2018 21:54)  HR: 90 (05 Feb 2018 12:29) (80 - 90)  BP: 130/58 (05 Feb 2018 12:29) (126/59 - 132/60)  BP(mean): --  RR: 18 (05 Feb 2018 12:29) (18 - 18)  SpO2: --    Physical Examination:  General: AAO x3   HEENT: PERRLA, EOMI, hard of hearing  CV= S1 & S2 heard, RRR, no audible murmurs  Lungs= CTAB, no crackles or wheezes  Abdominal Examination= Obese, + BS, Soft, NTND  Extremity Examination= No LLE, +2 PP b/l    MEDICATIONS  (STANDING):  calcium carbonate 1250 mG + Vitamin D (OsCal 500 + D) 1 Tablet(s) Oral daily  cefepime  IVPB 2000 milliGRAM(s) IV Intermittent every 12 hours  gabapentin 100 milliGRAM(s) Oral daily  insulin glargine Injectable (LANTUS) 10 Unit(s) SubCutaneous at bedtime  insulin lispro (HumaLOG) corrective regimen sliding scale   SubCutaneous three times a day before meals  levothyroxine 25 MICROGram(s) Oral daily  pantoprazole    Tablet 40 milliGRAM(s) Oral before breakfast  predniSONE   Tablet 2.5 milliGRAM(s) Oral daily  warfarin 2 milliGRAM(s) Oral once    MEDICATIONS  (PRN):  acetaminophen   Tablet 650 milliGRAM(s) Oral every 4 hours PRN For Temp greater than 38.3 C (101F)  acetaminophen   Tablet. 650 milliGRAM(s) Oral every 6 hours PRN Mild Pain (1 - 3)  dextrose 50% Injectable 50 milliLiter(s) IV Push once PRN Blood Glucose at or below 70mg/dl and pt cannot take po - give stat and notify MD after giving medication for hypoglycemia  dextrose Gel 1 Dose(s) Oral once PRN Blood Glucose LESS THAN 70 milliGRAM(s)/deciLiter                 9.3    5.19  )-----------( 377      ( 05 Feb 2018 07:20 )             29.6     02-05    137  |  99  |  6<L>  ----------------------------<  71  3.6   |  28  |  0.4<L>    Ca    7.4<L>      05 Feb 2018 07:20

## 2018-02-06 DIAGNOSIS — R30.0 DYSURIA: ICD-10-CM

## 2018-02-06 DIAGNOSIS — R15.9 FULL INCONTINENCE OF FECES: ICD-10-CM

## 2018-02-06 DIAGNOSIS — B37.2 CANDIDIASIS OF SKIN AND NAIL: ICD-10-CM

## 2018-02-06 LAB — CREAT SERPL-MCNC: 0.6 MG/DL — LOW (ref 0.7–1.5)

## 2018-02-06 RX ORDER — WARFARIN SODIUM 2.5 MG/1
2.5 TABLET ORAL ONCE
Qty: 0 | Refills: 0 | Status: COMPLETED | OUTPATIENT
Start: 2018-02-06 | End: 2018-02-06

## 2018-02-06 RX ORDER — CEFPODOXIME PROXETIL 100 MG
200 TABLET ORAL EVERY 12 HOURS
Qty: 0 | Refills: 0 | Status: DISCONTINUED | OUTPATIENT
Start: 2018-02-06 | End: 2018-02-09

## 2018-02-06 RX ORDER — FLUCONAZOLE 150 MG/1
150 TABLET ORAL ONCE
Qty: 0 | Refills: 0 | Status: COMPLETED | OUTPATIENT
Start: 2018-02-06 | End: 2018-02-06

## 2018-02-06 RX ORDER — ESTROGENS, CONJUGATED 0.625 MG/G
1 CREAM WITH APPLICATOR VAGINAL AT BEDTIME
Qty: 0 | Refills: 0 | Status: DISCONTINUED | OUTPATIENT
Start: 2018-02-06 | End: 2018-02-09

## 2018-02-06 RX ADMIN — GABAPENTIN 100 MILLIGRAM(S): 400 CAPSULE ORAL at 11:13

## 2018-02-06 RX ADMIN — Medication 1 APPLICATORFUL: at 19:37

## 2018-02-06 RX ADMIN — Medication 200 MILLIGRAM(S): at 17:59

## 2018-02-06 RX ADMIN — FLUCONAZOLE 150 MILLIGRAM(S): 150 TABLET ORAL at 18:09

## 2018-02-06 RX ADMIN — WARFARIN SODIUM 2.5 MILLIGRAM(S): 2.5 TABLET ORAL at 21:57

## 2018-02-06 RX ADMIN — Medication 1 TABLET(S): at 18:00

## 2018-02-06 RX ADMIN — Medication 25 MICROGRAM(S): at 05:50

## 2018-02-06 RX ADMIN — CEFEPIME 100 MILLIGRAM(S): 1 INJECTION, POWDER, FOR SOLUTION INTRAMUSCULAR; INTRAVENOUS at 05:53

## 2018-02-06 NOTE — PROGRESS NOTE ADULT - SUBJECTIVE AND OBJECTIVE BOX
RACIEL MILLIGAN [id number]    89yFemale with PMHx Recurrent UTI, DVT on Coumadin, Temporal Arteritis, TIA, DMII with neuropathy, Lumpectomy, Bladder Prolapse with prior pessary, CAD status post MI, Hypothyroidism, and Breast CA status post lumpectomy admitted for dysuria and fevers secondary to recurrent UTI.    Hemodynamically stable and asymptomatic.    Vital Signs Last 24 Hrs  T(C): 35.8 (06 Feb 2018 12:25), Max: 36.2 (05 Feb 2018 20:56)  T(F): 96.4 (06 Feb 2018 12:25), Max: 97.2 (05 Feb 2018 20:56)  HR: 92 (06 Feb 2018 12:25) (85 - 823)  BP: 119/56 (06 Feb 2018 12:25) (105/52 - 119/56)  BP(mean): --  RR: 18 (06 Feb 2018 12:25) (18 - 18)  SpO2: --    Physical Examination:  General: AAO x3   HEENT: PERRLA, EOMI, hard of hearing  CV= S1 & S2 heard, RRR, no audible murmurs  Lungs= CTAB, no crackles or wheezes  Abdominal Examination= Obese, + BS, Soft, NTND  Extremity Examination= No LLE, +2 PP b/l    MEDICATIONS  (STANDING):  calcium carbonate 1250 mG + Vitamin D (OsCal 500 + D) 1 Tablet(s) Oral daily  cefpodoxime 200 milliGRAM(s) Oral every 12 hours  clotrimazole 2% Vaginal Cream 1 Applicatorful Vaginal two times a day  estrogens  Cream 1 Applicatorful Vaginal at bedtime  fluconAZOLE   Tablet 150 milliGRAM(s) Oral once  gabapentin 100 milliGRAM(s) Oral daily  insulin glargine Injectable (LANTUS) 10 Unit(s) SubCutaneous at bedtime  insulin lispro (HumaLOG) corrective regimen sliding scale   SubCutaneous three times a day before meals  levothyroxine 25 MICROGram(s) Oral daily  pantoprazole    Tablet 40 milliGRAM(s) Oral before breakfast  predniSONE   Tablet 2.5 milliGRAM(s) Oral daily    MEDICATIONS  (PRN):  acetaminophen   Tablet 650 milliGRAM(s) Oral every 4 hours PRN For Temp greater than 38.3 C (101F)  acetaminophen   Tablet. 650 milliGRAM(s) Oral every 6 hours PRN Mild Pain (1 - 3)  dextrose 50% Injectable 50 milliLiter(s) IV Push once PRN Blood Glucose at or below 70mg/dl and pt cannot take po - give stat and notify MD after giving medication for hypoglycemia  dextrose Gel 1 Dose(s) Oral once PRN Blood Glucose LESS THAN 70 milliGRAM(s)/deciLiter                          9.3    5.19  )-----------( 377      ( 05 Feb 2018 07:20 )             29.6   02-05    138  |  104  |  6<L>  ----------------------------<  132<H>  3.6   |  31  |  0.5<L>    Ca    7.6<L>      05 Feb 2018 16:00    PT/INR - ( 05 Feb 2018 07:20 )   PT: 18.20 sec;   INR: 1.67 ratio

## 2018-02-06 NOTE — CONSULT NOTE ADULT - SUBJECTIVE AND OBJECTIVE BOX
RACIEL MILLIGAN   89y   Female   [id number]    Chief Complaint: Recurrent UTIs    HPI: 89y DMII with neuropathy, h/o CAD/MI, temporal arteritis, TIA, hypothyroid, h/o DVT on coumadin recently admitted in 1/18 due to UTI with Pseudomonas aeruginosa s/p IV cefepime, discharged on oral ciprofloxacin, returned to ED on 2/2 with recurrent fevers (100.7), dysuria and a rash in her back. Patient had a bladder surgery with mesh done by Dr Kumar 10y ago and 1.5y ago started having recurrent UTIs was worked up and had a revision surgery of her mesh. Since then patient has been incontinent (wears diapers) and her daughter reports she had around 3-4 episodes of UTIs in the past year. Also reports since Nove/17 patient is incontinent for feces.     MEDICATIONS  (STANDING):  calcium carbonate 1250 mG + Vitamin D (OsCal 500 + D) 1 Tablet(s) Oral daily  cefepime  IVPB 2000 milliGRAM(s) IV Intermittent every 12 hours  gabapentin 100 milliGRAM(s) Oral daily  insulin glargine Injectable (LANTUS) 10 Unit(s) SubCutaneous at bedtime  insulin lispro (HumaLOG) corrective regimen sliding scale   SubCutaneous three times a day before meals  levothyroxine 25 MICROGram(s) Oral daily  pantoprazole    Tablet 40 milliGRAM(s) Oral before breakfast  predniSONE   Tablet 2.5 milliGRAM(s) Oral daily    MEDICATIONS  (PRN):  acetaminophen   Tablet 650 milliGRAM(s) Oral every 4 hours PRN For Temp greater than 38.3 C (101F)  acetaminophen   Tablet. 650 milliGRAM(s) Oral every 6 hours PRN Mild Pain (1 - 3)  dextrose 50% Injectable 50 milliLiter(s) IV Push once PRN Blood Glucose at or below 70mg/dl and pt cannot take po - give stat and notify MD after giving medication for hypoglycemia  dextrose Gel 1 Dose(s) Oral once PRN Blood Glucose LESS THAN 70 milliGRAM(s)/deciLiter      Allergies    Bactrim (Unknown)  Cipro (Unknown) - rash   fluoroquinolone antibiotics (Other (Severe)) - hallucinations  methenamine (Unknown)  nitrofurantoin (Unknown)  sulfa drugs (Unknown)    Intolerances        PAST MEDICAL & SURGICAL HISTORY:      OB/GYN HISTORY:          	Gyn: denies history of abnormal pap, STI, ovarian cysts, or uterine fibro                        Obstetric:  G  P                                      FAMILY HISTORY:      SOCIAL HISTORY: Denies cigarette use, alcohol, or other drugs    REVIEW OF SYSTEMS:    CONSTITUTIONAL: No weakness, fevers or chills  EYES/ENT: No visual changes;  No vertigo or throat pain   NECK: No pain or stiffness  RESPIRATORY: No cough, wheezing, hemoptysis; No shortness of breath  CARDIOVASCULAR: No chest pain or palpitations  GASTROINTESTINAL: No abdominal or epigastric pain. No nausea, vomiting, or hematemesis; No diarrhea or constipation. No melena or hematochezia.  GENITOURINARY: No dysuria, frequency or hematuria  NEUROLOGICAL: No numbness or weakness  SKIN: No itching, rashes  All other negative      Vital Signs Last 24 Hrs  T(C): 36 (06 Feb 2018 05:30), Max: 36.7 (05 Feb 2018 12:29)  T(F): 96.8 (06 Feb 2018 05:30), Max: 98.1 (05 Feb 2018 12:29)  HR: 85 (06 Feb 2018 05:30) (85 - 823)  BP: 105/52 (06 Feb 2018 05:30) (105/52 - 130/58)  BP(mean): --  RR: 18 (06 Feb 2018 05:30) (18 - 18)  SpO2: --      PHYSICAL EXAM:      Constitutional: AAOx3, NAD    Breasts: Normal    Respiratory: CTAB    Cardiovascular: NL S1/S2, no M/R/G    Gastrointestinal: Soft, nontender, nondistended, no rebound, guarding, or rigidity    Pelvic: Normal vulva, normal vagina, no bleeding, Cervix normal, closed, no bleeding, no abnormal discharge, Uterus anteverted, normal sized, no fundal tenderness, no adnexal masses or tenderness    Rectal:    LABS:                        9.3    5.19  )-----------( 377      ( 05 Feb 2018 07:20 )             29.6         02-05    138  |  104  |  6<L>  ----------------------------<  132<H>  3.6   |  31  |  0.5<L>    Ca    7.6<L>      05 Feb 2018 16:00      PT/INR - ( 05 Feb 2018 07:20 )   PT: 18.20 sec;   INR: 1.67 ratio                 RADIOLOGY & ADDITIONAL STUDIES:       ASSESSMENT:      PLAN: RACIEL MILLIGAN   89y   Female   [id number]    Chief Complaint: Recurrent UTIs    HPI: 89y DMII with neuropathy, h/o CAD/MI, temporal arteritis, TIA, hypothyroid, h/o DVT on coumadin, breast CA s/p lumpectomy recently admitted in 1/18 due to UTI with Pseudomonas aeruginosa s/p IV cefepime, discharged on oral ciprofloxacin, returned to ED on 2/2 with recurrent fevers (100.7), dysuria and a rash in her back. Patient had a bladder surgery with mesh done by Dr Kumar 10y ago and 1.5y ago started having recurrent UTIs was worked up and had a revision surgery of her mesh. Since then patient has been incontinent (wears diapers) and her daughter reports she had around 3-4 episodes of UTIs in the past year. Also reports since Nov/17 patient is incontinent for feces.   Patient does not have sensation that sh needs to void or have a bowel movement. She is not using the comode at this point. Patient's daughter reports daily fecal incontinence and that stool consistency is soft and mushy. She denies any prolapse symptoms. Denies stress incontinence.     MEDICATIONS  (STANDING):  calcium carbonate 1250 mG + Vitamin D (OsCal 500 + D) 1 Tablet(s) Oral daily  cefepime  IVPB 2000 milliGRAM(s) IV Intermittent every 12 hours  gabapentin 100 milliGRAM(s) Oral daily  insulin glargine Injectable (LANTUS) 10 Unit(s) SubCutaneous at bedtime  insulin lispro (HumaLOG) corrective regimen sliding scale   SubCutaneous three times a day before meals  levothyroxine 25 MICROGram(s) Oral daily  pantoprazole    Tablet 40 milliGRAM(s) Oral before breakfast  predniSONE   Tablet 2.5 milliGRAM(s) Oral daily    MEDICATIONS  (PRN):  acetaminophen   Tablet 650 milliGRAM(s) Oral every 4 hours PRN For Temp greater than 38.3 C (101F)  acetaminophen   Tablet. 650 milliGRAM(s) Oral every 6 hours PRN Mild Pain (1 - 3)  dextrose 50% Injectable 50 milliLiter(s) IV Push once PRN Blood Glucose at or below 70mg/dl and pt cannot take po - give stat and notify MD after giving medication for hypoglycemia  dextrose Gel 1 Dose(s) Oral once PRN Blood Glucose LESS THAN 70 milliGRAM(s)/deciLiter      Allergies    Bactrim (Unknown) -diffuse rash  Cipro (Unknown) - rash   fluoroquinolone antibiotics (Other (Severe)) - hallucinations  methenamine (Unknown)  nitrofurantoin (Unknown)  sulfa drugs (Unknown)    Intolerances        PAST MEDICAL & SURGICAL HISTORY: See HPI      OB/GYN HISTORY:          	Gyn: denies history of abnormal pap, STI, ovarian cysts, or uterine fibro                                                    FAMILY HISTORY: non contributory      SOCIAL HISTORY: Denies cigarette use, alcohol, or other drugs    REVIEW OF SYSTEMS:    CONSTITUTIONAL: Fever and chills. No weakness  EYES/ENT: Hearing loss. No visual changes;  No vertigo or throat pain   NECK: No pain or stiffness  RESPIRATORY: No cough, wheezing, hemoptysis; No shortness of breath  CARDIOVASCULAR: No chest pain or palpitations  GASTROINTESTINAL: No abdominal or epigastric pain. No nausea, vomiting, or hematemesis; No diarrhea or constipation. No melena or hematochezia.  NEUROLOGICAL: No numbness or weakness  SKIN: No itching, rashes  All other negative      Vital Signs Last 24 Hrs  T(C): 36 (06 Feb 2018 05:30), Max: 36.7 (05 Feb 2018 12:29)  T(F): 96.8 (06 Feb 2018 05:30), Max: 98.1 (05 Feb 2018 12:29)  HR: 85 (06 Feb 2018 05:30) (85 - 823)  BP: 105/52 (06 Feb 2018 05:30) (105/52 - 130/58)  BP(mean): --  RR: 18 (06 Feb 2018 05:30) (18 - 18)  SpO2: --  Last fever 100.7F in ED 2/2/18    PHYSICAL EXAM:    Constitutional: AAOx3, NAD    Abdomen: Soft, nontender, nondistended    Pelvic: Severe erythema with crusted yeast in the labial folds. On digital exam no evidence of prolapse, no blood in the vagina or vault.     LABS:               9.3    5.19  )-----------( 377      ( 05 Feb 2018 07:20 )             29.6         02-05    138  |  104  |  6<L>  ----------------------------<  132<H>  3.6   |  31  |  0.5<L>    Ca    7.6<L>      05 Feb 2018 16:00      PT/INR - ( 05 Feb 2018 07:20 )   PT: 18.20 sec;   INR: 1.67 ratio           RADIOLOGY & ADDITIONAL STUDIES:     March/17 CT abd/pelvis - KIDNEYS: Symmetric pattern of enhancement bilaterally. No hydronephrosis. Bilateral simple renal cysts and additional subcentimeter hypodense lesions too small to characterize. Nonobstructing 2 mm calculus in the right interpolar region.

## 2018-02-06 NOTE — CONSULT NOTE ADULT - PROBLEM SELECTOR RECOMMENDATION 9
Order Renal ultrasound   Advised further manage for fecal incontinence (see below)  Advised starting estrogen vaginal cream (premarin or estrace - pea size amount to opening of the vagina every night for 2 weeks, then 3 nights per week)  Daughter will be working on obtaining her records, including last cystoscopy from previous Urogyn provider  Will check PVR in the office   My office staff will contact patient's daughter to schedule follow up in 1-2weeks

## 2018-02-06 NOTE — CONSULT NOTE ADULT - PROBLEM SELECTOR RECOMMENDATION 4
Diflucan 150mg single dose  Miconazole vaginal cream (or similar) to be applied to the vulva 2x/day for 7days

## 2018-02-06 NOTE — PROGRESS NOTE ADULT - ASSESSMENT
89yo Female with PMHx Recurrent UTI, DVT on Coumadin, Temporal Arteritis, TIA, DMII with neuropathy, Lumpectomy, Bladder Prolapse with prior pessary, CAD status post MI, Hypothyroidism, and Breast CA status post lumpectomy admitted for dysuria and fevers secondary to recurrent UTI.    1) Recurrent UTI: pt suffered recent allergy to Cipro.  Switch IV antibiotics to PO Vantin 200mg PO q12.  Urine cultures for positive for a pan-sensitive organism.  2) Hx of Bladder Prolapse/urinary incontinence: see above for urogynecology recommendations.  Will Fluconazole x1 for suspected fungal vaginitis.  Will also add Premarin for vulvar atrophy.  Follow-up with Gyn as outpatient for urodynamics studies.  3) DVT on Coumadin/Coumadin Toxicity: complicated by acute blood loss anemia.  Hgb improved to 9.3 following transfusion.  INR was less than 2 yesterday.  Coumadin 2.5mg was given at bedtime.  Follow-up results from PT/INR this morning.  4) Temporal Arteritis: continue Prednisone 2.5mg PO q24  5) DMII: continue Lantus/Lispro as directed.  Monitor FS with meals.  For peripheral neuropathy, continue Neurontin.  6) Stage II sacral decubitus ulcer: continue local wound care as per nursing  7) GI/DVT prophylaxis  Follow-up with physical therapy and encourage OOB to chair

## 2018-02-06 NOTE — PROGRESS NOTE ADULT - SUBJECTIVE AND OBJECTIVE BOX
RACIEL MILLIGAN [id number]    Hospitalist Note  89yFemale with PMHx Recurrent UTI, DVT on Coumadin, Temporal Arteritis, TIA, DMII with neuropathy, Lumpectomy, Bladder Prolapse with prior pessary, CAD status post MI, Hypothyroidism, and Breast CA status post lumpectomy admitted for dysuria and fevers secondary to recurrent UTI.   Her clinical course was complicated by Coumadin toxicity (secondary to prior Cipro use) and acute on chronic anemia (status post 1u PRBC transfusion).    Overnight events/Updates: her Hgb has remained stable >9 following transfusion.  Urogynecology was consulted due to recurrent UTIs and vaginal atrophy--her case was discussed with attending Dr. Worrell, who advised treatment with anti-fungals, topical estrogen replacement therapy, and outpatient urodynamic studies.  The pt complained of irritation and discomfort to the chest wall.  Her chest appeared unremarkable upon physical examination.    Vital Signs Last 24 Hrs  T(C): 36 (06 Feb 2018 05:30), Max: 36.7 (05 Feb 2018 12:29)  T(F): 96.8 (06 Feb 2018 05:30), Max: 98.1 (05 Feb 2018 12:29)  HR: 85 (06 Feb 2018 05:30) (85 - 823)  BP: 105/52 (06 Feb 2018 05:30) (105/52 - 130/58)  BP(mean): --  RR: 18 (06 Feb 2018 05:30) (18 - 18)  SpO2: --    Physical Examination:  General: AAO x 3  HEENT: PERRLA, EOMI, [place]  CV= S1 & S2 appreciated.  Hypersensitivity to the chest wall  Lungs=CTA BL  Abdominal Examination= + BS, Soft, NT/ND  Extremity Examination= No C/C.  Mild lower extremity edema    ROS: No chest pain, no shortness of breath.  History of fecal and urinary incontinence.  All other systems reviewed and are within normal limits except for the complaints in the HPI.    MEDICATIONS  (STANDING):  calcium carbonate 1250 mG + Vitamin D (OsCal 500 + D) 1 Tablet(s) Oral daily  cefepime  IVPB 2000 milliGRAM(s) IV Intermittent every 12 hours  clotrimazole 2% Vaginal Cream 1 Applicatorful Vaginal two times a day  estrogens  Cream 1 Applicatorful Vaginal at bedtime  fluconAZOLE   Tablet 150 milliGRAM(s) Oral once  gabapentin 100 milliGRAM(s) Oral daily  insulin glargine Injectable (LANTUS) 10 Unit(s) SubCutaneous at bedtime  insulin lispro (HumaLOG) corrective regimen sliding scale   SubCutaneous three times a day before meals  levothyroxine 25 MICROGram(s) Oral daily  pantoprazole    Tablet 40 milliGRAM(s) Oral before breakfast  predniSONE   Tablet 2.5 milliGRAM(s) Oral daily    MEDICATIONS  (PRN):  acetaminophen   Tablet 650 milliGRAM(s) Oral every 4 hours PRN For Temp greater than 38.3 C (101F)  acetaminophen   Tablet. 650 milliGRAM(s) Oral every 6 hours PRN Mild Pain (1 - 3)  dextrose 50% Injectable 50 milliLiter(s) IV Push once PRN Blood Glucose at or below 70mg/dl and pt cannot take po - give stat and notify MD after giving medication for hypoglycemia  dextrose Gel 1 Dose(s) Oral once PRN Blood Glucose LESS THAN 70 milliGRAM(s)/deciLiter                            9.3    5.19  )-----------( 377      ( 05 Feb 2018 07:20 )             29.6     02-05    138  |  104  |  6<L>  ----------------------------<  132<H>  3.6   |  31  |  0.5<L>    Ca    7.6<L>      05 Feb 2018 16:00        Case discussed with housestaff & family  CARLOS Angel [phone]

## 2018-02-06 NOTE — CONSULT NOTE ADULT - PROBLEM SELECTOR RECOMMENDATION 2
Can be secondary to acute infection however unlikely secondary to results of urine culture  Can be secondary to her atrophy, vulvar irritation from yeast and therefore would recommend treatment for both   Can also be due to chronic irritation from urinary incontinence, advised the patient's  daughter to restart desitin cream 2x/day to the vulva once treatment for the yeast is completed  Patient can also take AZO as needed for burning

## 2018-02-06 NOTE — CONSULT NOTE ADULT - ASSESSMENT
IMPRESSION: Rehab of debility secondary to UTI which is reccurrent. Was very recently on 4A.    PRECAUTIONS: [  ] Cardiac  [  ] Respiratory  [  ] Seizures [  ] Contact Isolation  [  ] Droplet Isolation  [  ] Other    Weight Bearing Status:     RECOMMENDATION:    Out of Bed to Chair     DVT/Decubiti Prophylaxis    REHAB PLAN:     [x   ] Bedside P/T 3-5 times a week   [   ]   Bedside O/T  2-3 times a week             [   ] No Rehab Therapy Indicated                   [   ]  Speech Therapy   Conditioning/ROM                                    ADL  Bed Mobility                                               Conditioning/ROM  Transfers                                                     Bed Mobility  Sitting /Standing Balance                         Transfers                                        Gait Training                                               Sitting/Standing Balance  Stair Training [   ]Applicable                    Home equipment Eval                                                                        Splinting  [   ] Only      GOALS:   ADL   [   ]   Independent                    Transfers  [   ] Independent                          Ambulation  [   ] Independent     [    ] With device                            [x   ]  CG                                                         [   ]  CG                                                                  [ x  ] CG                            [    ] Min A                                                   [   ] xMin A                                                              [   ] Min  A          DISCHARGE PLAN:   [   ]  Good candidate for Intensive Rehabilitation/Hospital based-4A [facility]                                             Will tolerate 3hrs Intensive Rehab Daily                                       [ X   ]  Short Term Rehab in Skilled Nursing Facility is suggested. Won't tolerate 4A. Was just on 4A.                                       [    ]  Home with Outpatient or  services                                         [    ]  Possible Candidate for Intensive Hospital based Rehab
89y multiple comorbidities (see HPI) with h/o recurrent UTIs admitted with dysuria

## 2018-02-06 NOTE — PROGRESS NOTE ADULT - PROBLEM SELECTOR PLAN 2
No prolapse as per Uro-Gyn. Vulvulitis possibly causing the pain. Estrogen creams and antifungal recommended.

## 2018-02-07 ENCOUNTER — TRANSCRIPTION ENCOUNTER (OUTPATIENT)
Age: 83
End: 2018-02-07

## 2018-02-07 LAB
APTT BLD: 30.7 SEC — SIGNIFICANT CHANGE UP (ref 27–39.2)
INR BLD: 1.87 RATIO — HIGH (ref 0.65–1.3)
PROTHROM AB SERPL-ACNC: 20.5 SEC — HIGH (ref 9.95–12.87)

## 2018-02-07 RX ORDER — WARFARIN SODIUM 2.5 MG/1
TABLET ORAL
Qty: 0 | Refills: 0 | Status: DISCONTINUED | OUTPATIENT
Start: 2018-02-07 | End: 2018-02-08

## 2018-02-07 RX ORDER — LEVOTHYROXINE SODIUM 125 MCG
1 TABLET ORAL
Qty: 0 | Refills: 0 | COMMUNITY
Start: 2018-02-07

## 2018-02-07 RX ORDER — INSULIN LISPRO 100/ML
1 VIAL (ML) SUBCUTANEOUS
Qty: 0 | Refills: 0 | COMMUNITY
Start: 2018-02-07

## 2018-02-07 RX ORDER — INSULIN GLARGINE 100 [IU]/ML
10 INJECTION, SOLUTION SUBCUTANEOUS
Qty: 0 | Refills: 0 | COMMUNITY
Start: 2018-02-07

## 2018-02-07 RX ORDER — CEFPODOXIME PROXETIL 100 MG
1 TABLET ORAL
Qty: 10 | Refills: 0 | OUTPATIENT
Start: 2018-02-07 | End: 2018-02-11

## 2018-02-07 RX ORDER — LIDOCAINE 4 G/100G
5 CREAM TOPICAL ONCE
Qty: 0 | Refills: 0 | Status: COMPLETED | OUTPATIENT
Start: 2018-02-07 | End: 2018-02-07

## 2018-02-07 RX ORDER — WARFARIN SODIUM 2.5 MG/1
1 TABLET ORAL
Qty: 30 | Refills: 0 | OUTPATIENT
Start: 2018-02-07 | End: 2018-03-08

## 2018-02-07 RX ORDER — PANTOPRAZOLE SODIUM 20 MG/1
1 TABLET, DELAYED RELEASE ORAL
Qty: 0 | Refills: 0 | COMMUNITY
Start: 2018-02-07

## 2018-02-07 RX ORDER — WARFARIN SODIUM 2.5 MG/1
2.5 TABLET ORAL ONCE
Qty: 0 | Refills: 0 | Status: DISCONTINUED | OUTPATIENT
Start: 2018-02-07 | End: 2018-02-08

## 2018-02-07 RX ORDER — ESTROGENS, CONJUGATED 0.625 MG/G
1 CREAM WITH APPLICATOR VAGINAL
Qty: 1 | Refills: 0 | OUTPATIENT
Start: 2018-02-07 | End: 2018-02-21

## 2018-02-07 RX ORDER — METRONIDAZOLE 7.5 MG/G
1 GEL VAGINAL
Qty: 0 | Refills: 0 | Status: DISCONTINUED | OUTPATIENT
Start: 2018-02-07 | End: 2018-02-09

## 2018-02-07 RX ORDER — GABAPENTIN 400 MG/1
1 CAPSULE ORAL
Qty: 0 | Refills: 0 | COMMUNITY
Start: 2018-02-07

## 2018-02-07 RX ADMIN — Medication 200 MILLIGRAM(S): at 06:34

## 2018-02-07 RX ADMIN — LIDOCAINE 5 MILLILITER(S): 4 CREAM TOPICAL at 15:10

## 2018-02-07 RX ADMIN — Medication 2.5 MILLIGRAM(S): at 08:10

## 2018-02-07 RX ADMIN — GABAPENTIN 100 MILLIGRAM(S): 400 CAPSULE ORAL at 12:07

## 2018-02-07 RX ADMIN — Medication 200 MILLIGRAM(S): at 18:37

## 2018-02-07 RX ADMIN — WARFARIN SODIUM 2.5 MILLIGRAM(S): 2.5 TABLET ORAL at 22:07

## 2018-02-07 RX ADMIN — Medication 25 MICROGRAM(S): at 06:34

## 2018-02-07 NOTE — DISCHARGE NOTE ADULT - PATIENT PORTAL LINK FT
You can access the [facility], offered by [facility], by registering with the following website: [email or web address]

## 2018-02-07 NOTE — DISCHARGE NOTE ADULT - HOSPITAL COURSE
Patient was admitted for dysuria and fever, treated for recurrent UTI. No hydronephrosis on Retroperitoneal Ultrasound. Patient was reporting pain in groin area, seen by UroGyn, r/o bladder prolapse, diagnosed with vulvitis and vaginitis. Will be discharged on Cefpodoxime, estrogen cream and antifungal cream. 89yFemale with PMHx Recurrent UTI, DVT on Coumadin, Temporal Arteritis, TIA, DMII with neuropathy, Lumpectomy, Bladder Prolapse with prior pessary, CAD status post MI, Hypothyroidism, and Breast CA status post lumpectomy admitted for dysuria and fevers secondary to recurrent UTI.   Her clinical course was complicated by Coumadin toxicity (secondary to prior Cipro use) and acute on chronic anemia (status post 1u PRBC transfusion).  . 89yFemale with PMHx Recurrent UTI, DVT on Coumadin, Temporal Arteritis, TIA, DMII with neuropathy, Lumpectomy, Bladder Prolapse with prior pessary, CAD status post MI, Hypothyroidism, and Breast CA status post lumpectomy admitted for dysuria and fevers secondary to recurrent UTI.   Her clinical course was complicated by Coumadin toxicity (secondary to prior Cipro use) and acute on chronic anemia (status post 1u PRBC transfusion).  The pt was subsequently transitioned to oral Vantin.  Renal/bladder US was negative for obstruction or hydronephrosis.    Urogyencology was consulted for recurrent UTIs and history of vulvar atrophy.  The pt was seen and evaluated by Dr. Flori Beatty, who recommended supportive care with antifungal creams and estrogen cream for vulvar atrophy, and to complete urodynamic studies as outpatient.    The pt also complained of increased discomfort to her neck.  No rash or swelling was noted on physical examination.  Symptoms were not affected by deep inspiration or movement of the extremities.  A "soreness" was provoked by light palpation.  After a discussion with patient and family, they were in agreement with not initiating any additional medications due to increased risk of drug-drug interactions.  The pt was then cleared for discharge home with outpatient services

## 2018-02-07 NOTE — DISCHARGE NOTE ADULT - CARE PROVIDER_API CALL
Shankar Reynoso  Phone: [phone]  Fax: (   )    -    Flori Worrell), Female Pelvic MedReconst Surg; Obstetrics and Gynecology  [address] [place] [address]  Phone: [phone]  Fax: [phone]

## 2018-02-07 NOTE — DISCHARGE NOTE ADULT - PLAN OF CARE
treat and prevent recurrence Take Vantin 200mg twice a day for 5 more days. monitor Resume Prednisone 2.5 daily Resume clotrimazole cream. Proper hygiene symptomatic treatment Resume estrogen cream. resolved Episode of anemia, resolved after transfusion. F/u your CBC and PT INR at the coumadin clinic regularly. Treat and prevent recurrence Asymptomatic at present

## 2018-02-07 NOTE — DISCHARGE NOTE ADULT - PROVIDER TOKENS
FREE:[LAST:[Edgardo],FIRST:[Shankar],PHONE:[phone]],FAX:[(   [phone]    -]],TOKEN:'[id number]'

## 2018-02-07 NOTE — PROGRESS NOTE ADULT - SUBJECTIVE AND OBJECTIVE BOX
RACIEL MILLIGAN [id number]    Hospitalist Note  89yFemale with PMHx Recurrent UTI, DVT on Coumadin, Temporal Arteritis, TIA, DMII with neuropathy, Lumpectomy, Bladder Prolapse with prior pessary, CAD status post MI, Hypothyroidism, and Breast CA status post lumpectomy admitted for dysuria and fevers secondary to recurrent UTI.   Her clinical course was complicated by Coumadin toxicity (secondary to prior Cipro use) and acute on chronic anemia (status post 1u PRBC transfusion).    Overnight events/Updates: her Hgb has remained stable >9 following transfusion.  The pt complained of discomfort to the chest wall yesterday.  No rash or excoriations were appreciated on physical examination.  Her "soreness" is still present, though she appears more comfortable.      Vital Signs Last 24 Hrs  T(C): 36.2 (07 Feb 2018 05:26), Max: 36.4 (06 Feb 2018 20:57)  T(F): 97.2 (07 Feb 2018 05:26), Max: 97.6 (06 Feb 2018 20:57)  HR: 81 (07 Feb 2018 05:26) (81 - 94)  BP: 118/56 (07 Feb 2018 05:26) (110/55 - 119/56)  BP(mean): --  RR: 18 (07 Feb 2018 05:26) (18 - 18)  SpO2: --    Physical Examination:  General: AAO x 3  HEENT: PERRLA, EOMI, [place]  no dentition  CV= S1 & S2 appreciated.  Hypersensitivity to the chest wall  Lungs=CTA BL  Abdominal Examination= + BS, Soft, NT/ND  Extremity Examination= No C/C.  Mild lower extremity edema    ROS: No chest pain, no shortness of breath.  Hx of urinary and fecal incontinence  All other systems reviewed and are within normal limits except for the complaints in the HPI.    MEDICATIONS  (STANDING):  calcium carbonate 1250 mG + Vitamin D (OsCal 500 + D) 1 Tablet(s) Oral daily  cefpodoxime 200 milliGRAM(s) Oral every 12 hours  clotrimazole 2% Vaginal Cream 1 Applicatorful Vaginal two times a day  estrogens  Cream 1 Applicatorful Vaginal at bedtime  gabapentin 100 milliGRAM(s) Oral daily  insulin glargine Injectable (LANTUS) 10 Unit(s) SubCutaneous at bedtime  insulin lispro (HumaLOG) corrective regimen sliding scale   SubCutaneous three times a day before meals  levothyroxine 25 MICROGram(s) Oral daily  pantoprazole    Tablet 40 milliGRAM(s) Oral before breakfast  predniSONE   Tablet 2.5 milliGRAM(s) Oral daily    MEDICATIONS  (PRN):  acetaminophen   Tablet 650 milliGRAM(s) Oral every 4 hours PRN For Temp greater than 38.3 C (101F)  acetaminophen   Tablet. 650 milliGRAM(s) Oral every 6 hours PRN Mild Pain (1 - 3)  dextrose 50% Injectable 50 milliLiter(s) IV Push once PRN Blood Glucose at or below 70mg/dl and pt cannot take po - give stat and notify MD after giving medication for hypoglycemia  dextrose Gel 1 Dose(s) Oral once PRN Blood Glucose LESS THAN 70 milliGRAM(s)/deciLiter        02-05    138  |  104  |  6<L>  ----------------------------<  132<H>  3.6   |  31  |  0.5<L>    Ca    7.6<L>      05 Feb 2018 16:00        Case discussed with housestaff & family  CARLOS Angel [id number]

## 2018-02-07 NOTE — DISCHARGE NOTE ADULT - CARE PLAN
Principal Discharge DX:	Recurrent urinary tract infection  Goal:	treat and prevent recurrence  Assessment and plan of treatment:	Take Vantin 200mg twice a day for 5 more days.  Secondary Diagnosis:	Temporal arteritis  Goal:	monitor  Assessment and plan of treatment:	Resume Prednisone 2.5 daily  Secondary Diagnosis:	Yeast dermatitis  Goal:	treat and prevent recurrence  Assessment and plan of treatment:	Resume clotrimazole cream. Proper hygiene  Secondary Diagnosis:	Chronic vaginitis  Goal:	symptomatic treatment  Assessment and plan of treatment:	Resume estrogen cream.  Secondary Diagnosis:	Poisoning by warfarin sodium, accidental or unintentional, sequela  Goal:	resolved  Assessment and plan of treatment:	Episode of anemia, resolved after transfusion. F/u your CBC and PT INR at the coumadin clinic regularly. Principal Discharge DX:	Recurrent urinary tract infection  Goal:	Treat and prevent recurrence  Assessment and plan of treatment:	Take Vantin 200mg twice a day for 5 more days.  Secondary Diagnosis:	Temporal arteritis  Goal:	Asymptomatic at present  Assessment and plan of treatment:	Resume Prednisone 2.5 daily  Secondary Diagnosis:	Yeast dermatitis  Goal:	treat and prevent recurrence  Assessment and plan of treatment:	Resume clotrimazole cream. Proper hygiene  Secondary Diagnosis:	Chronic vaginitis  Goal:	symptomatic treatment  Assessment and plan of treatment:	Resume estrogen cream.  Secondary Diagnosis:	Poisoning by warfarin sodium, accidental or unintentional, sequela  Goal:	resolved  Assessment and plan of treatment:	Episode of anemia, resolved after transfusion. F/u your CBC and PT INR at the coumadin clinic regularly. Principal Discharge DX:	Recurrent urinary tract infection  Goal:	Treat and prevent recurrence  Assessment and plan of treatment:	Take Vantin 200mg twice a day for 5 more days.  Secondary Diagnosis:	Temporal arteritis  Goal:	Asymptomatic at present  Assessment and plan of treatment:	Resume Prednisone 2.5 daily  Secondary Diagnosis:	Yeast dermatitis  Goal:	treat and prevent recurrence  Assessment and plan of treatment:	Resume clotrimazole cream. Proper hygiene  Secondary Diagnosis:	Chronic vaginitis  Goal:	symptomatic treatment  Assessment and plan of treatment:	Resume estrogen cream.

## 2018-02-07 NOTE — PROGRESS NOTE ADULT - ASSESSMENT
89yo Female with PMHx Recurrent UTI, DVT on Coumadin, Temporal Arteritis, TIA, DMII with neuropathy, Lumpectomy, Bladder Prolapse with prior pessary, CAD status post MI, Hypothyroidism, and Breast CA status post lumpectomy admitted for dysuria and fevers secondary to recurrent UTI.    1) Recurrent UTI: pt suffered recent allergy to Cipro.  Continue PO Vantin 200mg PO q12.  Urine cultures for positive for a pan-sensitive organism.  2) Hx of Bladder Prolapse/urinary incontinence: Follow-up uro-gynecology as outpatient for urodynamic studies and management of urinary incontinence.  Status post Fluconazole.  Continue supportive care with Premarin cream to the vulva.    3) DVT on Coumadin/Coumadin Toxicity: complicated by acute blood loss anemia.  Hgb improved to 9.3 following transfusion.  Awaiting results from repeat labwork.  Coumadin 2.5mg was given last night.    4) Temporal Arteritis: continue Prednisone 2.5mg PO q24  5) DMII: continue Lantus/Lispro as directed.  Monitor FS with meals.  For peripheral neuropathy, continue Neurontin.  6) Stage II sacral decubitus ulcer: continue local wound care as per nursing  7) GI/DVT prophylaxis    Discharge planning.  Encourage participation with physical therapy

## 2018-02-07 NOTE — DISCHARGE NOTE ADULT - MEDICATION SUMMARY - MEDICATIONS TO TAKE
I will START or STAY ON the medications listed below when I get home from the hospital:    predniSONE 2.5 mg oral tablet  -- 1 tab(s) by mouth once a day  -- Indication: For Temporal arteritis    Coumadin 2.5 mg oral tablet  -- 1 tab(s) by mouth once a day   -- Do not take this drug if you are pregnant.  It is very important that you take or use this exactly as directed.  Do not skip doses or discontinue unless directed by your doctor.  Obtain medical advice before taking any non-prescription drugs as some may affect the action of this medication.    -- Indication: For Temporal arteritis    gabapentin 100 mg oral capsule  -- 1 cap(s) by mouth once a day  -- Indication: For Peripheral neuropathy    Lantus 100 units/mL subcutaneous solution  -- 10 unit(s) subcutaneous once a day (at bedtime)  -- Indication: For Diabetes    cefpodoxime 200 mg oral tablet  -- 1 tab(s) by mouth every 12 hours  -- Indication: For Dysuria    clotrimazole 2% vaginal cream with applicator  -- 1 applicatorful vaginally 2 times a day  -- Indication: For Dysuria    conjugated estrogens 0.625 mg/g vaginal cream with applicator  -- 1 applicatorful vaginally once a day (at bedtime)  -- Indication: For Dysuria    levothyroxine 25 mcg (0.025 mg) oral tablet  -- 1 tab(s) by mouth once a day  -- Indication: For Hypothyrodoism    calcium-vitamin D 500 mg-200 intl units oral tablet  -- 1 tab(s) by mouth once a day  -- Indication: For Healthcare maintenance

## 2018-02-07 NOTE — DISCHARGE NOTE ADULT - INSTRUCTIONS
regular diet CHO consistent diet patient has stage11 to sacrum , keep pressure off, alleyvn applied, cream around buttock for exoriation.

## 2018-02-07 NOTE — DISCHARGE NOTE ADULT - SECONDARY DIAGNOSIS.
Temporal arteritis Yeast dermatitis Chronic vaginitis Poisoning by warfarin sodium, accidental or unintentional, sequela

## 2018-02-08 DIAGNOSIS — E87.6 HYPOKALEMIA: ICD-10-CM

## 2018-02-08 LAB
ANION GAP SERPL CALC-SCNC: 8 MMOL/L — SIGNIFICANT CHANGE UP (ref 7–14)
BUN SERPL-MCNC: 7 MG/DL — LOW (ref 10–20)
CALCIUM SERPL-MCNC: 8 MG/DL — LOW (ref 8.5–10.1)
CHLORIDE SERPL-SCNC: 100 MMOL/L — SIGNIFICANT CHANGE UP (ref 98–110)
CO2 SERPL-SCNC: 30 MMOL/L — SIGNIFICANT CHANGE UP (ref 17–32)
CREAT SERPL-MCNC: 0.5 MG/DL — LOW (ref 0.7–1.5)
GLUCOSE SERPL-MCNC: 95 MG/DL — SIGNIFICANT CHANGE UP (ref 70–110)
HCT VFR BLD CALC: 32.6 % — LOW (ref 37–47)
HGB BLD-MCNC: 10.2 G/DL — LOW (ref 14–18)
INR BLD: 1.89 RATIO — HIGH (ref 0.65–1.3)
MCHC RBC-ENTMCNC: 26.4 PG — LOW (ref 27–31)
MCHC RBC-ENTMCNC: 31.3 G/DL — LOW (ref 32–37)
MCV RBC AUTO: 84.5 FL — SIGNIFICANT CHANGE UP (ref 81–91)
NRBC # BLD: 0 /100 WBCS — SIGNIFICANT CHANGE UP (ref 0–0)
PLATELET # BLD AUTO: 368 K/UL — SIGNIFICANT CHANGE UP (ref 130–400)
POTASSIUM SERPL-MCNC: 3 MMOL/L — LOW (ref 3.5–5)
POTASSIUM SERPL-SCNC: 3 MMOL/L — LOW (ref 3.5–5)
PROTHROM AB SERPL-ACNC: 20.7 SEC — HIGH (ref 9.95–12.87)
RBC # BLD: 3.86 M/UL — LOW (ref 4.2–5.4)
RBC # FLD: 16.5 % — HIGH (ref 11.5–14.5)
SODIUM SERPL-SCNC: 138 MMOL/L — SIGNIFICANT CHANGE UP (ref 135–146)
WBC # BLD: 6.12 K/UL — SIGNIFICANT CHANGE UP (ref 4.8–10.8)
WBC # FLD AUTO: 6.12 K/UL — SIGNIFICANT CHANGE UP (ref 4.8–10.8)

## 2018-02-08 RX ORDER — CEFPODOXIME PROXETIL 100 MG
1 TABLET ORAL
Qty: 10 | Refills: 0 | OUTPATIENT
Start: 2018-02-08 | End: 2018-02-12

## 2018-02-08 RX ORDER — POTASSIUM CHLORIDE 20 MEQ
40 PACKET (EA) ORAL ONCE
Qty: 0 | Refills: 0 | Status: COMPLETED | OUTPATIENT
Start: 2018-02-08 | End: 2018-02-08

## 2018-02-08 RX ORDER — WARFARIN SODIUM 2.5 MG/1
2.5 TABLET ORAL ONCE
Qty: 0 | Refills: 0 | Status: COMPLETED | OUTPATIENT
Start: 2018-02-08 | End: 2018-02-08

## 2018-02-08 RX ADMIN — Medication 40 MILLIEQUIVALENT(S): at 16:44

## 2018-02-08 RX ADMIN — METRONIDAZOLE 1 APPLICATORFUL: 7.5 GEL VAGINAL at 17:06

## 2018-02-08 RX ADMIN — Medication 200 MILLIGRAM(S): at 18:29

## 2018-02-08 RX ADMIN — PANTOPRAZOLE SODIUM 40 MILLIGRAM(S): 20 TABLET, DELAYED RELEASE ORAL at 08:02

## 2018-02-08 RX ADMIN — Medication 200 MILLIGRAM(S): at 06:35

## 2018-02-08 RX ADMIN — WARFARIN SODIUM 2.5 MILLIGRAM(S): 2.5 TABLET ORAL at 21:23

## 2018-02-08 RX ADMIN — METRONIDAZOLE 1 APPLICATORFUL: 7.5 GEL VAGINAL at 06:31

## 2018-02-08 RX ADMIN — GABAPENTIN 100 MILLIGRAM(S): 400 CAPSULE ORAL at 14:43

## 2018-02-08 RX ADMIN — Medication 2.5 MILLIGRAM(S): at 08:02

## 2018-02-08 RX ADMIN — Medication 25 MICROGRAM(S): at 06:35

## 2018-02-08 NOTE — PROGRESS NOTE ADULT - SUBJECTIVE AND OBJECTIVE BOX
RACIEL MILLIGAN [id number]    Hospitalist Note  89yFemale with PMHx Recurrent UTI, DVT on Coumadin, Temporal Arteritis, TIA, DMII with neuropathy, Lumpectomy, Bladder Prolapse with prior pessary, CAD status post MI, Hypothyroidism, and Breast CA status post lumpectomy admitted for dysuria and fevers secondary to recurrent UTI.   Her clinical course was complicated by Coumadin toxicity (secondary to prior Cipro use) and acute on chronic anemia (status post 1u PRBC transfusion).    Overnight events/Updates: Renal/bladder US was negative for obstruction.  No acute events over the past 24 hours    Vital Signs Last 24 Hrs  T(C): 36.9 (08 Feb 2018 13:25), Max: 36.9 (08 Feb 2018 13:25)  T(F): 98.5 (08 Feb 2018 13:25), Max: 98.5 (08 Feb 2018 13:25)  HR: 99 (08 Feb 2018 13:25) (87 - 99)  BP: 117/59 (08 Feb 2018 13:25) (110/55 - 131/63)  BP(mean): --  RR: 18 (08 Feb 2018 13:25) (18 - 20)  SpO2: --    Physical Examination:  General: AAO x 3  HEENT: PERRLA, EOMI, [place]  no dentition  CV= S1 & S2 appreciated.  Hypersensitivity to the chest wall  Lungs=CTA BL  Abdominal Examination= + BS, Soft, NT/ND  Extremity Examination= No C/C.  Mild lower extremity edema    ROS: No chest pain, no shortness of breath.  Hx of urinary and fecal incontinence  All other systems reviewed and are within normal limits except for the complaints in the HPI    MEDICATIONS  (STANDING):  calcium carbonate 1250 mG + Vitamin D (OsCal 500 + D) 1 Tablet(s) Oral daily  cefpodoxime 200 milliGRAM(s) Oral every 12 hours  estrogens  Cream 1 Applicatorful Vaginal at bedtime  gabapentin 100 milliGRAM(s) Oral daily  insulin glargine Injectable (LANTUS) 10 Unit(s) SubCutaneous at bedtime  insulin lispro (HumaLOG) corrective regimen sliding scale   SubCutaneous three times a day before meals  levothyroxine 25 MICROGram(s) Oral daily  metroNIDAZOLE 0.75% Vaginal Gel 1 Applicatorful Vaginal two times a day  pantoprazole    Tablet 40 milliGRAM(s) Oral before breakfast  potassium chloride    Tablet ER 40 milliEquivalent(s) Oral once  predniSONE   Tablet 2.5 milliGRAM(s) Oral daily  warfarin 2.5 milliGRAM(s) Oral once  warfarin      warfarin 2.5 milliGRAM(s) Oral once    MEDICATIONS  (PRN):  acetaminophen   Tablet 650 milliGRAM(s) Oral every 4 hours PRN For Temp greater than 38.3 C (101F)  acetaminophen   Tablet. 650 milliGRAM(s) Oral every 6 hours PRN Mild Pain (1 - 3)  dextrose 50% Injectable 50 milliLiter(s) IV Push once PRN Blood Glucose at or below 70mg/dl and pt cannot take po - give stat and notify MD after giving medication for hypoglycemia  dextrose Gel 1 Dose(s) Oral once PRN Blood Glucose LESS THAN 70 milliGRAM(s)/deciLiter                            10.2   6.12  )-----------( 368      ( 08 Feb 2018 04:30 )             32.6     02-08    138  |  100  |  7<L>  ----------------------------<  95  3.0<L>   |  30  |  0.5<L>    Ca    8.0<L>      08 Feb 2018 04:30        Case discussed with housestaff & family  CARLOS Angel [phone]

## 2018-02-08 NOTE — PROGRESS NOTE ADULT - PROBLEM SELECTOR PLAN 4
Likely secondary to Warfarin toxicity. Resolved after transfusion. Hemodynamically stable. Coumadin 2.5mg tonight. INR subtherapeutic

## 2018-02-08 NOTE — PROGRESS NOTE ADULT - ASSESSMENT
89yo Female with PMHx Recurrent UTI, DVT on Coumadin, Temporal Arteritis, TIA, DMII with neuropathy, Lumpectomy, Bladder Prolapse with prior pessary, CAD status post MI, Hypothyroidism, and Breast CA status post lumpectomy admitted for dysuria and fevers secondary to recurrent UTI.    1) Recurrent UTI: pt suffered recent allergy to Cipro.  Continue PO Vantin 200mg PO q12 for a total of 1 week.  Urine cultures for positive for a pan-sensitive organism.  2) Hx of Bladder Prolapse/urinary incontinence: Follow-up uro-gynecology as outpatient for urodynamic studies and management of urinary incontinence.  Status post Fluconazole.  Continue supportive care with Premarin cream to the vulva.    3) DVT on Coumadin/Coumadin Toxicity: complicated by acute blood loss anemia.  Hgb improved to >10.  INR stable at 1.9  Continue Coumadin 2.5mg at bedtime    4) Temporal Arteritis: continue Prednisone 2.5mg PO q24  5) DMII: continue Lantus/Lispro as directed.  Monitor FS with meals.  For peripheral neuropathy, continue Neurontin.  6) Stage II sacral decubitus ulcer: continue local wound care as per nursing  7) GI/DVT prophylaxis    Discharge home; time spent =90 minutes

## 2018-02-08 NOTE — PHYSICAL THERAPY INITIAL EVALUATION ADULT - GENERAL OBSERVATIONS, REHAB EVAL
PT IE time 10am - 10:45am. Pt demonstrated high anxiety and required max encouragement and max verbal cueing for safe transfers.

## 2018-02-08 NOTE — PROGRESS NOTE ADULT - SUBJECTIVE AND OBJECTIVE BOX
RACIEL MILLIGAN [id number]    89yFemale with PMHx Recurrent UTI, DVT on Coumadin, Temporal Arteritis, TIA, DMII with neuropathy, Lumpectomy, Bladder Prolapse with prior pessary, CAD status post MI, Hypothyroidism, and Breast CA status post lumpectomy admitted for dysuria and fevers secondary to recurrent UTI.    Hemodynamically stable and asymptomatic.    Vital Signs Last 24 Hrs  T(C): 36.9 (08 Feb 2018 13:25), Max: 36.9 (08 Feb 2018 13:25)  T(F): 98.5 (08 Feb 2018 13:25), Max: 98.5 (08 Feb 2018 13:25)  HR: 99 (08 Feb 2018 13:25) (87 - 99)  BP: 117/59 (08 Feb 2018 13:25) (110/55 - 131/63)  BP(mean): --  RR: 18 (08 Feb 2018 13:25) (18 - 20)  SpO2: --    Physical Examination:  General: AAO x3   HEENT: PERRLA, EOMI, hard of hearing  CV= S1 & S2 heard, RRR, no audible murmurs  Lungs= CTAB, no crackles or wheezes  Abdominal Examination= Obese, + BS, Soft, NTND  Extremity Examination= No LLE, +2 PP b/l    MEDICATIONS  (STANDING):  calcium carbonate 1250 mG + Vitamin D (OsCal 500 + D) 1 Tablet(s) Oral daily  cefpodoxime 200 milliGRAM(s) Oral every 12 hours  estrogens  Cream 1 Applicatorful Vaginal at bedtime  gabapentin 100 milliGRAM(s) Oral daily  insulin glargine Injectable (LANTUS) 10 Unit(s) SubCutaneous at bedtime  insulin lispro (HumaLOG) corrective regimen sliding scale   SubCutaneous three times a day before meals  levothyroxine 25 MICROGram(s) Oral daily  metroNIDAZOLE 0.75% Vaginal Gel 1 Applicatorful Vaginal two times a day  pantoprazole    Tablet 40 milliGRAM(s) Oral before breakfast  predniSONE   Tablet 2.5 milliGRAM(s) Oral daily  warfarin 2.5 milliGRAM(s) Oral once  warfarin      warfarin 2.5 milliGRAM(s) Oral once    MEDICATIONS  (PRN):  acetaminophen   Tablet 650 milliGRAM(s) Oral every 4 hours PRN For Temp greater than 38.3 C (101F)  acetaminophen   Tablet. 650 milliGRAM(s) Oral every 6 hours PRN Mild Pain (1 - 3)  dextrose 50% Injectable 50 milliLiter(s) IV Push once PRN Blood Glucose at or below 70mg/dl and pt cannot take po - give stat and notify MD after giving medication for hypoglycemia  dextrose Gel 1 Dose(s) Oral once PRN Blood Glucose LESS THAN 70 milliGRAM(s)/deciLiter    Home Medications:  gabapentin 100 mg oral capsule: 1 cap(s) orally once a day (07 Feb 2018 10:32)  Lantus 100 units/mL subcutaneous solution: 10 unit(s) subcutaneous once a day (at bedtime) (07 Feb 2018 10:32)  levothyroxine 25 mcg (0.025 mg) oral tablet: 1 tab(s) orally once a day (07 Feb 2018 10:32)  predniSONE 2.5 mg oral tablet: 1 tab(s) orally once a day (07 Feb 2018 10:32)    02-08    138  |  100  |  7<L>  ----------------------------<  95  3.0<L>   |  30  |  0.5<L>    Ca    8.0<L>      08 Feb 2018 04:30                          10.2   6.12  )-----------( 368      ( 08 Feb 2018 04:30 )             32.6

## 2018-02-09 VITALS
SYSTOLIC BLOOD PRESSURE: 148 MMHG | TEMPERATURE: 97 F | HEART RATE: 100 BPM | RESPIRATION RATE: 18 BRPM | DIASTOLIC BLOOD PRESSURE: 69 MMHG

## 2018-02-09 LAB
ANION GAP SERPL CALC-SCNC: 5 MMOL/L — LOW (ref 7–14)
ANION GAP SERPL CALC-SCNC: 8 MMOL/L — SIGNIFICANT CHANGE UP (ref 7–14)
BASOPHILS # BLD AUTO: 0.05 K/UL — SIGNIFICANT CHANGE UP (ref 0–0.2)
BASOPHILS NFR BLD AUTO: 0.8 % — SIGNIFICANT CHANGE UP (ref 0–1)
BUN SERPL-MCNC: 7 MG/DL — LOW (ref 10–20)
BUN SERPL-MCNC: 7 MG/DL — LOW (ref 10–20)
CALCIUM SERPL-MCNC: 7.9 MG/DL — LOW (ref 8.5–10.1)
CALCIUM SERPL-MCNC: 8 MG/DL — LOW (ref 8.5–10.1)
CHLORIDE SERPL-SCNC: 100 MMOL/L — SIGNIFICANT CHANGE UP (ref 98–110)
CHLORIDE SERPL-SCNC: 103 MMOL/L — SIGNIFICANT CHANGE UP (ref 98–110)
CO2 SERPL-SCNC: 29 MMOL/L — SIGNIFICANT CHANGE UP (ref 17–32)
CO2 SERPL-SCNC: 31 MMOL/L — SIGNIFICANT CHANGE UP (ref 17–32)
CREAT SERPL-MCNC: 0.4 MG/DL — LOW (ref 0.7–1.5)
CREAT SERPL-MCNC: 0.5 MG/DL — LOW (ref 0.7–1.5)
EOSINOPHIL # BLD AUTO: 0.41 K/UL — SIGNIFICANT CHANGE UP (ref 0–0.7)
EOSINOPHIL NFR BLD AUTO: 6.5 % — SIGNIFICANT CHANGE UP (ref 0–8)
GLUCOSE SERPL-MCNC: 100 MG/DL — SIGNIFICANT CHANGE UP (ref 70–110)
GLUCOSE SERPL-MCNC: 81 MG/DL — SIGNIFICANT CHANGE UP (ref 70–110)
HCT VFR BLD CALC: 32.1 % — LOW (ref 37–47)
HGB BLD-MCNC: 9.8 G/DL — LOW (ref 14–18)
IMM GRANULOCYTES NFR BLD AUTO: 0.3 % — SIGNIFICANT CHANGE UP (ref 0.1–0.3)
INR BLD: 2.17 RATIO — HIGH (ref 0.65–1.3)
LYMPHOCYTES # BLD AUTO: 1.67 K/UL — SIGNIFICANT CHANGE UP (ref 1.2–3.4)
LYMPHOCYTES # BLD AUTO: 26.6 % — SIGNIFICANT CHANGE UP (ref 20.5–51.1)
MAGNESIUM SERPL-MCNC: 1.4 MG/DL — LOW (ref 1.8–2.4)
MCHC RBC-ENTMCNC: 25.9 PG — LOW (ref 27–31)
MCHC RBC-ENTMCNC: 30.5 G/DL — LOW (ref 32–37)
MCV RBC AUTO: 84.9 FL — SIGNIFICANT CHANGE UP (ref 81–91)
MONOCYTES # BLD AUTO: 0.91 K/UL — HIGH (ref 0.1–0.6)
MONOCYTES NFR BLD AUTO: 14.5 % — HIGH (ref 1.7–9.3)
NEUTROPHILS # BLD AUTO: 3.21 K/UL — SIGNIFICANT CHANGE UP (ref 1.4–6.5)
NEUTROPHILS NFR BLD AUTO: 51.3 % — SIGNIFICANT CHANGE UP (ref 42.2–75.2)
NRBC # BLD: 0 /100 WBCS — SIGNIFICANT CHANGE UP (ref 0–0)
PLATELET # BLD AUTO: 348 K/UL — SIGNIFICANT CHANGE UP (ref 130–400)
POTASSIUM SERPL-MCNC: 3.5 MMOL/L — SIGNIFICANT CHANGE UP (ref 3.5–5)
POTASSIUM SERPL-MCNC: 3.7 MMOL/L — SIGNIFICANT CHANGE UP (ref 3.5–5)
POTASSIUM SERPL-SCNC: 3.5 MMOL/L — SIGNIFICANT CHANGE UP (ref 3.5–5)
POTASSIUM SERPL-SCNC: 3.7 MMOL/L — SIGNIFICANT CHANGE UP (ref 3.5–5)
PROTHROM AB SERPL-ACNC: 23.8 SEC — HIGH (ref 9.95–12.87)
RBC # BLD: 3.78 M/UL — LOW (ref 4.2–5.4)
RBC # FLD: 16.9 % — HIGH (ref 11.5–14.5)
SODIUM SERPL-SCNC: 137 MMOL/L — SIGNIFICANT CHANGE UP (ref 135–146)
SODIUM SERPL-SCNC: 139 MMOL/L — SIGNIFICANT CHANGE UP (ref 135–146)
WBC # BLD: 6.27 K/UL — SIGNIFICANT CHANGE UP (ref 4.8–10.8)
WBC # FLD AUTO: 6.27 K/UL — SIGNIFICANT CHANGE UP (ref 4.8–10.8)

## 2018-02-09 RX ORDER — CEFPODOXIME PROXETIL 100 MG
1 TABLET ORAL
Qty: 6 | Refills: 0 | OUTPATIENT
Start: 2018-02-09 | End: 2018-02-11

## 2018-02-09 RX ORDER — LEVOTHYROXINE SODIUM 125 MCG
1 TABLET ORAL
Qty: 0 | Refills: 0 | COMMUNITY

## 2018-02-09 RX ADMIN — Medication 200 MILLIGRAM(S): at 06:16

## 2018-02-09 RX ADMIN — Medication 2.5 MILLIGRAM(S): at 11:16

## 2018-02-09 RX ADMIN — Medication 25 MICROGRAM(S): at 06:15

## 2018-02-09 RX ADMIN — GABAPENTIN 100 MILLIGRAM(S): 400 CAPSULE ORAL at 11:16

## 2018-02-09 RX ADMIN — METRONIDAZOLE 1 APPLICATORFUL: 7.5 GEL VAGINAL at 06:15

## 2018-02-09 NOTE — PROGRESS NOTE ADULT - ASSESSMENT
89yo Female with PMHx Recurrent UTI, DVT on Coumadin, Temporal Arteritis, TIA, DMII with neuropathy, Lumpectomy, Bladder Prolapse with prior pessary, CAD status post MI, Hypothyroidism, and Breast CA status post lumpectomy admitted for dysuria and fevers secondary to recurrent UTI.    1) Recurrent UTI: pt suffered recent allergy to Cipro.  Continue PO Vantin 200mg PO q12 until 2/13.   2) Hx of Bladder Prolapse/urinary incontinence: Follow-up uro-gynecology as outpatient for urodynamic studies and management of urinary incontinence.  Status post Fluconazole.  Continue Premarin cream to the vulva.    3) DVT on Coumadin/Coumadin Toxicity: complicated by acute blood loss anemia.  Hgb improved to >10.  INR within therapeutic range @ 2.2.  Continue Coumadin 2.5mg at bedtime    4) Temporal Arteritis: continue Prednisone 2.5mg PO q24  5) DMII: continue Lantus/Lispro as directed.  Monitor FS with meals.  For peripheral neuropathy, continue Neurontin.  6) Stage II sacral decubitus ulcer: continue local wound care as per nursing  7) GI/DVT prophylaxis    Discharge home; time spent =90 minutes

## 2018-02-09 NOTE — PROGRESS NOTE ADULT - SUBJECTIVE AND OBJECTIVE BOX
RACIEL MILLIGAN [id number]    Hospitalist Note  89yFemale with PMHx Recurrent UTI, DVT on Coumadin, Temporal Arteritis, TIA, DMII with neuropathy, Lumpectomy, Bladder Prolapse with prior pessary, CAD status post MI, Hypothyroidism, and Breast CA status post lumpectomy admitted for dysuria and fevers secondary to recurrent UTI.   Her clinical course was complicated by Coumadin toxicity (secondary to prior Cipro use) and acute on chronic anemia (status post 1u PRBC transfusion).    Overnight events/Updates: Renal/bladder US was negative for obstruction.  Discharge was delayed 24 hours due to technical difficulties.  The pt reports persistent soreness to the anterior chest.    Vital Signs Last 24 Hrs  T(C): 36.1 (09 Feb 2018 05:41), Max: 36.9 (08 Feb 2018 13:25)  T(F): 97 (09 Feb 2018 05:41), Max: 98.5 (08 Feb 2018 13:25)  HR: 88 (09 Feb 2018 05:41) (86 - 99)  BP: 120/60 (09 Feb 2018 05:41) (117/59 - 135/63)  BP(mean): --  RR: 18 (09 Feb 2018 05:41) (18 - 18)  SpO2: --    Physical Examination:  General: AAO x 3  HEENT: PERRLA, EOMI, [place]  no dentition  CV= S1 & S2 appreciated.  Hypersensitivity to the chest wall  Lungs=CTA BL  Abdominal Examination= + BS, Soft, NT/ND  Extremity Examination= No C/C.  Mild lower extremity edema    ROS: No chest pain, no shortness of breath.  Hx of urinary and fecal incontinence  All other systems reviewed and are within normal limits except for the complaints in the HPI    MEDICATIONS  (STANDING):  calcium carbonate 1250 mG + Vitamin D (OsCal 500 + D) 1 Tablet(s) Oral daily  cefpodoxime 200 milliGRAM(s) Oral every 12 hours  estrogens  Cream 1 Applicatorful Vaginal at bedtime  gabapentin 100 milliGRAM(s) Oral daily  insulin glargine Injectable (LANTUS) 10 Unit(s) SubCutaneous at bedtime  insulin lispro (HumaLOG) corrective regimen sliding scale   SubCutaneous three times a day before meals  levothyroxine 25 MICROGram(s) Oral daily  metroNIDAZOLE 0.75% Vaginal Gel 1 Applicatorful Vaginal two times a day  pantoprazole    Tablet 40 milliGRAM(s) Oral before breakfast  predniSONE   Tablet 2.5 milliGRAM(s) Oral daily    MEDICATIONS  (PRN):  acetaminophen   Tablet 650 milliGRAM(s) Oral every 4 hours PRN For Temp greater than 38.3 C (101F)  acetaminophen   Tablet. 650 milliGRAM(s) Oral every 6 hours PRN Mild Pain (1 - 3)  dextrose 50% Injectable 50 milliLiter(s) IV Push once PRN Blood Glucose at or below 70mg/dl and pt cannot take po - give stat and notify MD after giving medication for hypoglycemia  dextrose Gel 1 Dose(s) Oral once PRN Blood Glucose LESS THAN 70 milliGRAM(s)/deciLiter                            9.8    6.27  )-----------( 348      ( 09 Feb 2018 04:30 )             32.1     02-09    137  |  100  |  7<L>  ----------------------------<  81  3.5   |  29  |  0.4<L>    Ca    7.9<L>      09 Feb 2018 04:30  Mg     1.4     02-09    INR 2.2    Case discussed with housestaff & family  CARLOS Angel [phone]

## 2018-02-12 DIAGNOSIS — Z88.0 ALLERGY STATUS TO PENICILLIN: ICD-10-CM

## 2018-02-12 DIAGNOSIS — A41.9 SEPSIS, UNSPECIFIED ORGANISM: ICD-10-CM

## 2018-02-12 DIAGNOSIS — E03.9 HYPOTHYROIDISM, UNSPECIFIED: ICD-10-CM

## 2018-02-12 DIAGNOSIS — L89.152 PRESSURE ULCER OF SACRAL REGION, STAGE 2: ICD-10-CM

## 2018-02-12 DIAGNOSIS — I48.91 UNSPECIFIED ATRIAL FIBRILLATION: ICD-10-CM

## 2018-02-12 DIAGNOSIS — Z87.440 PERSONAL HISTORY OF URINARY (TRACT) INFECTIONS: ICD-10-CM

## 2018-02-12 DIAGNOSIS — R32 UNSPECIFIED URINARY INCONTINENCE: ICD-10-CM

## 2018-02-12 DIAGNOSIS — Z86.73 PERSONAL HISTORY OF TRANSIENT ISCHEMIC ATTACK (TIA), AND CEREBRAL INFARCTION WITHOUT RESIDUAL DEFICITS: ICD-10-CM

## 2018-02-12 DIAGNOSIS — N81.10 CYSTOCELE, UNSPECIFIED: ICD-10-CM

## 2018-02-12 DIAGNOSIS — D62 ACUTE POSTHEMORRHAGIC ANEMIA: ICD-10-CM

## 2018-02-12 DIAGNOSIS — Z85.3 PERSONAL HISTORY OF MALIGNANT NEOPLASM OF BREAST: ICD-10-CM

## 2018-02-12 DIAGNOSIS — N39.0 URINARY TRACT INFECTION, SITE NOT SPECIFIED: ICD-10-CM

## 2018-02-12 DIAGNOSIS — E11.40 TYPE 2 DIABETES MELLITUS WITH DIABETIC NEUROPATHY, UNSPECIFIED: ICD-10-CM

## 2018-02-12 DIAGNOSIS — N76.2 ACUTE VULVITIS: ICD-10-CM

## 2018-02-12 DIAGNOSIS — E87.6 HYPOKALEMIA: ICD-10-CM

## 2018-02-12 DIAGNOSIS — M19.90 UNSPECIFIED OSTEOARTHRITIS, UNSPECIFIED SITE: ICD-10-CM

## 2018-02-12 DIAGNOSIS — E11.59 TYPE 2 DIABETES MELLITUS WITH OTHER CIRCULATORY COMPLICATIONS: ICD-10-CM

## 2018-02-12 DIAGNOSIS — I11.0 HYPERTENSIVE HEART DISEASE WITH HEART FAILURE: ICD-10-CM

## 2018-02-12 DIAGNOSIS — T45.515A ADVERSE EFFECT OF ANTICOAGULANTS, INITIAL ENCOUNTER: ICD-10-CM

## 2018-02-12 DIAGNOSIS — Z79.01 LONG TERM (CURRENT) USE OF ANTICOAGULANTS: ICD-10-CM

## 2018-02-12 DIAGNOSIS — I50.9 HEART FAILURE, UNSPECIFIED: ICD-10-CM

## 2018-02-12 DIAGNOSIS — M31.6 OTHER GIANT CELL ARTERITIS: ICD-10-CM

## 2018-02-12 DIAGNOSIS — Z79.84 LONG TERM (CURRENT) USE OF ORAL HYPOGLYCEMIC DRUGS: ICD-10-CM

## 2018-02-12 DIAGNOSIS — I25.2 OLD MYOCARDIAL INFARCTION: ICD-10-CM

## 2018-04-11 ENCOUNTER — INPATIENT (INPATIENT)
Facility: HOSPITAL | Age: 83
LOS: 8 days | Discharge: HOME HEALTH | End: 2018-04-20
Attending: INTERNAL MEDICINE | Admitting: INTERNAL MEDICINE

## 2018-04-11 VITALS
SYSTOLIC BLOOD PRESSURE: 116 MMHG | DIASTOLIC BLOOD PRESSURE: 61 MMHG | TEMPERATURE: 97 F | RESPIRATION RATE: 18 BRPM | HEART RATE: 100 BPM | OXYGEN SATURATION: 97 %

## 2018-04-11 DIAGNOSIS — Z98.890 OTHER SPECIFIED POSTPROCEDURAL STATES: Chronic | ICD-10-CM

## 2018-04-11 LAB
ALBUMIN SERPL ELPH-MCNC: 2.9 G/DL — LOW (ref 3.5–5.2)
ALP SERPL-CCNC: 63 U/L — SIGNIFICANT CHANGE UP (ref 30–115)
ALT FLD-CCNC: 6 U/L — SIGNIFICANT CHANGE UP (ref 0–41)
ANION GAP SERPL CALC-SCNC: 19 MMOL/L — HIGH (ref 7–14)
ANION GAP SERPL CALC-SCNC: 20 MMOL/L — HIGH (ref 7–14)
APPEARANCE UR: (no result)
APTT BLD: 28.5 SEC — SIGNIFICANT CHANGE UP (ref 27–39.2)
AST SERPL-CCNC: 24 U/L — SIGNIFICANT CHANGE UP (ref 0–41)
BILIRUB SERPL-MCNC: 0.5 MG/DL — SIGNIFICANT CHANGE UP (ref 0.2–1.2)
BILIRUB UR-MCNC: NEGATIVE — SIGNIFICANT CHANGE UP
BUN SERPL-MCNC: 6 MG/DL — LOW (ref 10–20)
BUN SERPL-MCNC: 6 MG/DL — LOW (ref 10–20)
CALCIUM SERPL-MCNC: 8 MG/DL — LOW (ref 8.5–10.1)
CALCIUM SERPL-MCNC: 8 MG/DL — LOW (ref 8.5–10.1)
CHLORIDE SERPL-SCNC: 91 MMOL/L — LOW (ref 98–110)
CHLORIDE SERPL-SCNC: 93 MMOL/L — LOW (ref 98–110)
CK MB CFR SERPL CALC: <0.1 NG/ML — LOW (ref 0.6–6.3)
CK SERPL-CCNC: 29 U/L — SIGNIFICANT CHANGE UP (ref 0–225)
CO2 SERPL-SCNC: 21 MMOL/L — SIGNIFICANT CHANGE UP (ref 17–32)
CO2 SERPL-SCNC: 22 MMOL/L — SIGNIFICANT CHANGE UP (ref 17–32)
COLOR SPEC: YELLOW — SIGNIFICANT CHANGE UP
CREAT SERPL-MCNC: 0.6 MG/DL — LOW (ref 0.7–1.5)
CREAT SERPL-MCNC: 0.6 MG/DL — LOW (ref 0.7–1.5)
DIFF PNL FLD: (no result)
GLUCOSE SERPL-MCNC: 166 MG/DL — HIGH (ref 70–99)
GLUCOSE SERPL-MCNC: 170 MG/DL — HIGH (ref 70–99)
GLUCOSE UR QL: NEGATIVE MG/DL — SIGNIFICANT CHANGE UP
HCT VFR BLD CALC: 36.4 % — LOW (ref 37–47)
HGB BLD-MCNC: 11.7 G/DL — LOW (ref 12–16)
INR BLD: 3.88 RATIO — HIGH (ref 0.65–1.3)
KETONES UR-MCNC: (no result)
LEUKOCYTE ESTERASE UR-ACNC: (no result)
MCHC RBC-ENTMCNC: 26.3 PG — LOW (ref 27–31)
MCHC RBC-ENTMCNC: 32.1 G/DL — SIGNIFICANT CHANGE UP (ref 32–37)
MCV RBC AUTO: 81.8 FL — SIGNIFICANT CHANGE UP (ref 81–99)
NITRITE UR-MCNC: POSITIVE
NRBC # BLD: 0 /100 WBCS — SIGNIFICANT CHANGE UP (ref 0–0)
PH UR: 6.5 — SIGNIFICANT CHANGE UP (ref 5–8)
PLATELET # BLD AUTO: 363 K/UL — SIGNIFICANT CHANGE UP (ref 130–400)
POTASSIUM SERPL-MCNC: 3.8 MMOL/L — SIGNIFICANT CHANGE UP (ref 3.5–5)
POTASSIUM SERPL-MCNC: 3.9 MMOL/L — SIGNIFICANT CHANGE UP (ref 3.5–5)
POTASSIUM SERPL-SCNC: 3.8 MMOL/L — SIGNIFICANT CHANGE UP (ref 3.5–5)
POTASSIUM SERPL-SCNC: 3.9 MMOL/L — SIGNIFICANT CHANGE UP (ref 3.5–5)
PROT SERPL-MCNC: 5.5 G/DL — LOW (ref 6–8)
PROT UR-MCNC: 100 MG/DL
PROTHROM AB SERPL-ACNC: >40 SEC — HIGH (ref 9.95–12.87)
RBC # BLD: 4.45 M/UL — SIGNIFICANT CHANGE UP (ref 4.2–5.4)
RBC # FLD: 19.4 % — HIGH (ref 11.5–14.5)
SODIUM SERPL-SCNC: 132 MMOL/L — LOW (ref 135–146)
SODIUM SERPL-SCNC: 134 MMOL/L — LOW (ref 135–146)
SP GR SPEC: 1.01 — SIGNIFICANT CHANGE UP (ref 1.01–1.03)
TROPONIN T SERPL-MCNC: <0.01 NG/ML — SIGNIFICANT CHANGE UP
UROBILINOGEN FLD QL: 0.2 MG/DL — SIGNIFICANT CHANGE UP (ref 0.2–0.2)
WBC # BLD: 9.86 K/UL — SIGNIFICANT CHANGE UP (ref 4.8–10.8)
WBC # FLD AUTO: 9.86 K/UL — SIGNIFICANT CHANGE UP (ref 4.8–10.8)

## 2018-04-11 RX ORDER — SODIUM CHLORIDE 9 MG/ML
3 INJECTION INTRAMUSCULAR; INTRAVENOUS; SUBCUTANEOUS ONCE
Qty: 0 | Refills: 0 | Status: COMPLETED | OUTPATIENT
Start: 2018-04-11 | End: 2018-04-11

## 2018-04-11 RX ADMIN — SODIUM CHLORIDE 3 MILLILITER(S): 9 INJECTION INTRAMUSCULAR; INTRAVENOUS; SUBCUTANEOUS at 20:28

## 2018-04-11 NOTE — ED PROVIDER NOTE - MEDICAL DECISION MAKING DETAILS
pt placed in EDOU for syncope workup under 2 midnight rule for serial enzymes and further evaluation

## 2018-04-11 NOTE — ED PROVIDER NOTE - ATTENDING CONTRIBUTION TO CARE
89 yo female with PMH DVT on coumadin, hx breast cancer, TIA, DM, hypothyroidism BIB daughter c/o witnessed syncopal episode at home earlier. Daughter reports she ha da pain in her neck and then she passed out. Denies any CP, SOB, palpitations, dizziness or weakness. No fevers or chills. Tolerating PO as usual. Pt without any seizure like activity.  VITAL SIGNS: noted  CONSTITUTIONAL: Well-developed; well-nourished; in no acute distress  HEAD: Normocephalic; atraumatic  EYES: PERRL, EOM intact; conjunctiva and sclera clear  ENT: No nasal discharge; airway clear. MMM. Edentulous.  NECK: Supple; non tender.    CARD: S1, S2 normal; no murmurs, gallops, or rubs. Regular rate and rhythm  RESP: CTAB/L, no wheezes, rales or rhonchi  ABD: Normal bowel sounds; soft; non-distended; non-tender; no hepatosplenomegaly. No CVA tenderness  EXT: Normal ROM. No pitting edema. Distal pulses intact  NEURO: Alert, oriented. Grossly unremarkable. No focal deficits. No facial asymmetry, normal speech, MS 5/5 UE B/L, 3/5 LE B/L, neg pronator drift, sensory equal  SKIN: Skin exam is warm and dry, no acute rash

## 2018-04-11 NOTE — ED ADULT NURSE NOTE - OBJECTIVE STATEMENT
Patient felt dizzy, had neck pain then had witnessed syncopal episode. A&Ox2. Patient placed on cardiac monitor.

## 2018-04-11 NOTE — ED PROVIDER NOTE - OBJECTIVE STATEMENT
89 y/o female with PMHx of Breast Ca s/p lumpectomy, DM with neuropathy, Hypothyroidism, MI 2013, TIA 2016, DVT ~12 years ao on Coumadin, Diverticulosis, Osteoarthritis presented after witnessed syncopal episode, no LOC or head trauma. As per daughter, patient has been feeling weak for the past few days, unable to get out of bed. Today, after 3 days, patient was able to sit in a chair when she felt like her neck was hurting and then as per son, she was mumbling and felt cold and clammy. After a couple of minutes, she was back to her baseline and able to respond to her family normally. Denies fevers, CP, SOB, abdominal pain, N/V/D. Admits to dysuria- incontinent at baseline, wears diapers. As per daughter, completed her recent course of abx for UTI.      Patient has been in and out of the hospital several times in the past few months for temporal arteritis, UTIs, URI so family is unsure whether her weakness is related to that or general deconditioning.

## 2018-04-11 NOTE — ED CDU PROVIDER INITIAL DAY NOTE - ATTENDING CONTRIBUTION TO CARE
89F PMH dvt coumadin, dm, cad, hypothyroid, brca, recurrent UTI, TIA 2016, p/w syncope. witnessed by daughter who is at bedside. pt c/o neck pain and lightheaded, was seated in chair, had syncopal episode x seconds. no shaking, tongue biting, incontinence. no AMS. now back tobaseline. recent uti just finished 10 days cefpodoxime and had repeat culture done. no fever, chills. no cp, sob, abd pain, nvdc, flank pain. No HA. No longer has neck pain. Neck pain intermittent x 1 yr is chronic. No numbness, weakness, tingling, visual/gait/speech changes. Pt seen in ED, had labs, ekg/trop, placed in EDOU For syncope. on exam, AFVSS, well samuel nad, ncat, eomi, perrla, mmm, lctab, rrr nl s1s2 no mrg, abd soft ntnd, aaox3, CN 2-12 intact, No nystagmus.  5/5 motor x 4 ext, SILT x 4 extremities, No facial droop or slurred speech. No pronator drift.  Normal rapid alternating movement and finger nose finger bilaterally. No midline C/T/L tenderness to palpation or step off. Neck no rigiditiy, +supple, no carotid bruit, no le edema or calf ttp, a/p; Syncope - elderly, multiple comorbidities, failed outpt abx for UTI- will give iv abx, get CTH r/o ICH given on coumadin, dispo admit to tele.

## 2018-04-11 NOTE — ED CDU PROVIDER INITIAL DAY NOTE - OBJECTIVE STATEMENT
89y/o female with pmh of dm, mi, hypothyroid, chronic dvt on coumadin presents here for ?syncope. pt. was sitting in a chair became diaphoretic, c/o posterior neck pain and a brief episode of cp and she became unresponsive while sitting in a chair. she was unresponsive for about 2 minutes and fully returned to baseline. cardiologist dr. Abbasi. last time pt. saw him was about 2 years ago. daughter is at bedside who provides most of the history, she also witnessed the episode.

## 2018-04-11 NOTE — ED ADULT NURSE NOTE - PMH
Arthritis    DM (diabetes mellitus)    DVT (deep venous thrombosis)    Hernia    Hypothyroid    MI (myocardial infarction)    TIA (transient ischemic attack)

## 2018-04-11 NOTE — ED PROVIDER NOTE - PHYSICAL EXAMINATION
General: Laying in bed, NAD   HEENT: NC/AT, no LAD   Cardio: RRR, no murmurs   Resp: CTA B/L   Abdomen: Soft, NT/ND, +bowel sounds   MSK: Decreased muscle strength in all 4 extremities.  Neuro: AOx 2, unable to tell the year

## 2018-04-12 DIAGNOSIS — E11.9 TYPE 2 DIABETES MELLITUS WITHOUT COMPLICATIONS: ICD-10-CM

## 2018-04-12 DIAGNOSIS — R55 SYNCOPE AND COLLAPSE: ICD-10-CM

## 2018-04-12 DIAGNOSIS — M31.6 OTHER GIANT CELL ARTERITIS: ICD-10-CM

## 2018-04-12 DIAGNOSIS — N39.0 URINARY TRACT INFECTION, SITE NOT SPECIFIED: ICD-10-CM

## 2018-04-12 DIAGNOSIS — Z98.890 OTHER SPECIFIED POSTPROCEDURAL STATES: Chronic | ICD-10-CM

## 2018-04-12 DIAGNOSIS — G45.9 TRANSIENT CEREBRAL ISCHEMIC ATTACK, UNSPECIFIED: ICD-10-CM

## 2018-04-12 DIAGNOSIS — I82.409 ACUTE EMBOLISM AND THROMBOSIS OF UNSPECIFIED DEEP VEINS OF UNSPECIFIED LOWER EXTREMITY: ICD-10-CM

## 2018-04-12 DIAGNOSIS — E03.9 HYPOTHYROIDISM, UNSPECIFIED: ICD-10-CM

## 2018-04-12 DIAGNOSIS — Z94.7 CORNEAL TRANSPLANT STATUS: Chronic | ICD-10-CM

## 2018-04-12 LAB
APTT BLD: 37.8 SEC — SIGNIFICANT CHANGE UP (ref 27–39.2)
BACTERIA # UR AUTO: (no result) /HPF
CK MB CFR SERPL CALC: 1.1 NG/ML — SIGNIFICANT CHANGE UP (ref 0.6–6.3)
CK SERPL-CCNC: 24 U/L — SIGNIFICANT CHANGE UP (ref 0–225)
EPI CELLS # UR: (no result) /HPF
INR BLD: 3.02 RATIO — HIGH (ref 0.65–1.3)
PROTHROM AB SERPL-ACNC: 33.4 SEC — HIGH (ref 9.95–12.87)
RBC CASTS # UR COMP ASSIST: (no result) /HPF
TROPONIN T SERPL-MCNC: <0.01 NG/ML — SIGNIFICANT CHANGE UP
WBC UR QL: >50 /HPF

## 2018-04-12 RX ORDER — WARFARIN SODIUM 2.5 MG/1
2.5 TABLET ORAL ONCE
Qty: 0 | Refills: 0 | Status: DISCONTINUED | OUTPATIENT
Start: 2018-04-12 | End: 2018-04-12

## 2018-04-12 RX ORDER — CEFTRIAXONE 500 MG/1
1 INJECTION, POWDER, FOR SOLUTION INTRAMUSCULAR; INTRAVENOUS ONCE
Qty: 0 | Refills: 0 | Status: COMPLETED | OUTPATIENT
Start: 2018-04-12 | End: 2018-04-12

## 2018-04-12 RX ORDER — LEVOTHYROXINE SODIUM 125 MCG
25 TABLET ORAL DAILY
Qty: 0 | Refills: 0 | Status: DISCONTINUED | OUTPATIENT
Start: 2018-04-12 | End: 2018-04-20

## 2018-04-12 RX ORDER — GABAPENTIN 400 MG/1
200 CAPSULE ORAL DAILY
Qty: 0 | Refills: 0 | Status: DISCONTINUED | OUTPATIENT
Start: 2018-04-12 | End: 2018-04-17

## 2018-04-12 RX ORDER — CHLORPROPAMIDE 250 MG
50 TABLET ORAL DAILY
Qty: 0 | Refills: 0 | Status: DISCONTINUED | OUTPATIENT
Start: 2018-04-12 | End: 2018-04-13

## 2018-04-12 RX ORDER — GABAPENTIN 400 MG/1
100 CAPSULE ORAL ONCE
Qty: 0 | Refills: 0 | Status: COMPLETED | OUTPATIENT
Start: 2018-04-12 | End: 2018-04-12

## 2018-04-12 RX ORDER — GABAPENTIN 400 MG/1
300 CAPSULE ORAL DAILY
Qty: 0 | Refills: 0 | Status: DISCONTINUED | OUTPATIENT
Start: 2018-04-12 | End: 2018-04-12

## 2018-04-12 RX ORDER — ACETAMINOPHEN 500 MG
1000 TABLET ORAL
Qty: 0 | Refills: 0 | Status: DISCONTINUED | OUTPATIENT
Start: 2018-04-12 | End: 2018-04-20

## 2018-04-12 RX ADMIN — GABAPENTIN 100 MILLIGRAM(S): 400 CAPSULE ORAL at 06:49

## 2018-04-12 RX ADMIN — Medication 1000 MILLIGRAM(S): at 20:26

## 2018-04-12 RX ADMIN — CEFTRIAXONE 100 GRAM(S): 500 INJECTION, POWDER, FOR SOLUTION INTRAMUSCULAR; INTRAVENOUS at 13:10

## 2018-04-12 RX ADMIN — GABAPENTIN 200 MILLIGRAM(S): 400 CAPSULE ORAL at 19:10

## 2018-04-12 RX ADMIN — Medication 1000 MILLIGRAM(S): at 19:10

## 2018-04-12 NOTE — ED ADULT NURSE REASSESSMENT NOTE - NS ED NURSE REASSESS COMMENT FT1
Patient report received from previous RN, patient at this time is resting in bed with no acute distress is alert and awake, awaiting for further testing and disposition, currently in NSR, will continue to watch and assess patient,, safety and comfort measures maintained, family at bedside.

## 2018-04-12 NOTE — H&P ADULT - HISTORY OF PRESENT ILLNESS
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 As per daughter, patient has been feeling weak for the past few days, unable to get out of bed. Yesterday after 3 days, patient was able to sit in a chair when she felt like her neck was hurting and then as per son, she was mumbling and felt cold and clammy. She then slumped her chin down to her chest and syncopized for less than a minute. After a couple of minutes, she was back to her baseline and able to respond to her family normally. Her daughter said during the episode that she noticed a period of mild brief shaking which went away. She denied any foaming at the mouth, tongue biting and patient is incontinent at baseline.  Denies fevers, CP, SOB, abdominal pain, N/V/D. Admits to dysuria- incontinent at baseline, wears diapers. As per daughter, completed her recent course of abx for UTI.  Patient has been in and out of the hospital several times in the past few months for temporal arteritis, UTIs, URI so family is unsure whether her weakness is related to that or general deconditioning.

## 2018-04-12 NOTE — H&P ADULT - NSHPREVIEWOFSYSTEMS_GEN_ALL_CORE
REVIEW OF SYSTEMS:    CONSTITUTIONAL: No weakness, fevers or chills  EYES/ENT: No visual changes;  No vertigo or throat pain   NECK: No pain or stiffness  RESPIRATORY: No cough, wheezing, hemoptysis; No shortness of breath  CARDIOVASCULAR: No chest pain or palpitations  GASTROINTESTINAL: No abdominal or epigastric pain. No nausea, vomiting, or hematemesis; No diarrhea or constipation. No melena or hematochezia.  GENITOURINARY: No dysuria, frequency or hematuria  MUSCULOSKELETAL: No joint pains or myalgias  NEUROLOGICAL: No numbness or weakness  SKIN: No itching, rashes REVIEW OF SYSTEMS:    CONSTITUTIONAL: +generalized weakness, No fevers or chills  EYES/ENT: No visual changes;  No vertigo or throat pain   NECK: No pain or stiffness  RESPIRATORY: No cough, wheezing, hemoptysis; No shortness of breath  CARDIOVASCULAR: + syncope while in a chair, No chest pain or palpitations  GASTROINTESTINAL: No abdominal or epigastric pain. No nausea, vomiting, or hematemesis; No diarrhea or constipation. No melena or hematochezia.  GENITOURINARY: +Dysuria, no hematuria  MUSCULOSKELETAL: No joint pains or myalgias  NEUROLOGICAL: + LE weakness, No numbness   SKIN: No itching, rashes

## 2018-04-12 NOTE — ED CDU PROVIDER DISPOSITION NOTE - CLINICAL COURSE
cth neg, ekg/trop neg, pt given iv abx for uti recently failed outpt po abx, admitted to tele for further work up of syncope and treatment of uti

## 2018-04-12 NOTE — ED CDU PROVIDER SUBSEQUENT DAY NOTE - PROGRESS NOTE DETAILS
pt. has nitrite positive uti here, as per daughter who is at bedside she states that patient was recently treated for uti back to back 4 times. she just finished 10 day course of Cefpodaxine by pmd in [place]. that same doctor is doing urine culture and the result should be ready this friday as per pt.'s daughter. pt.'s daughter prefers to hold off on abx for now. d/w ed attending dr. Rey.

## 2018-04-12 NOTE — H&P ADULT - PROBLEM SELECTOR PLAN 1
- admit to tele   - CE neg x 2, EKG NSR, 2d echo pending   - will order routine EEG and consult neuro to r/o seizure, pt says she follows with    -check tsh, b12, folate

## 2018-04-12 NOTE — H&P ADULT - PSH
H/O cornea transplant    H/O hysterectomy for benign disease    History of appendectomy    History of lumpectomy of right breast

## 2018-04-12 NOTE — ED CDU PROVIDER SUBSEQUENT DAY NOTE - PROGRESS NOTE DETAILS
pt. in nad, family at bedside, will repeat 2nd set of cardiac enzymes, will get echo done in the morning. will continue to reassess. family member at bedside.

## 2018-04-12 NOTE — H&P ADULT - ATTENDING COMMENTS
Pt seen and examined independently. Daughter at bedside.  Pt had brief episode of syncope while sitting in a chair yesterday after she was in bed for 3 days.  She had brief confusion afterwards that resolved. No previous episodes of syncope but pt did have positive Tilt table in the past per her daughter. Pt now c/o right upper pain of chest/breast.  Decreased LE pain now. She normally ambulates with a walker but she has not ambulated much since discharge from the hospital.  Son and daughter are with the pt at home.  Tele- no events, pt in NSR  Check orthostatics.   Daughter refused EEG for now  Neuro eval pending  Continue Tele for 24hrs and d/c if no events  Cardiac enzymes negative    < from: Transthoracic Echocardiogram (04.12.18 @ 09:54) >    Summary:   1. Left ventricular ejection fraction, by visual estimation, is 50 to   55%.   2. Normal global left ventricular systolic function.   3. Spectral Doppler shows impaired relaxation pattern of left   ventricular myocardial filling (Grade I diastolic dysfunction).   4. Mild mitral valve regurgitation.    < end of copied text >    Rehab consult - pt always wants to go home with family on discharge    Treat UTI  (pt symptomatic with dysuria) - agree with ceftriaxone for now  f/u urine culture  Pt was seen by GYN last admission and she needs f/u as outpt    I reviewed the resident's note and I agree with the physical exam, assessment and plan with corrections/additions as above.

## 2018-04-12 NOTE — H&P ADULT - ASSESSMENT
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 CT head was stable, EKG NSR, CE neg x 2, EEG pending. Hospital course complicated by UTI.

## 2018-04-12 NOTE — H&P ADULT - NSHPSOCIALHISTORY_GEN_ALL_CORE
Marital Status:  (   )    (   ) Single    (   )    (  x)   Occupation:   Lives with: (  ) alone  (x  ) children   (  ) spouse   (  ) parents  (  ) other  Recent Travel:     Substance Use (street drugs): x(  ) never used  (  ) other:  Tobacco Usage:  (   x) never smoked   (   ) former smoker   (   ) current smoker  (     ) pack year  Alcohol Usage:no   Sexual History: neg Marital Status:  (   )    (   ) Single    (   )    (  x)   Occupation:   Lives with: (  ) alone  (x  ) children   (  ) spouse   (  ) parents  (  ) other  Recent Travel:     Substance Use (street drugs): x(  ) never used  (  ) other:  Tobacco Usage:  (   x) never smoked   (   ) former smoker   (   ) current smoker  (     ) pack year  Alcohol Usage: no   Sexual History: neg

## 2018-04-12 NOTE — H&P ADULT - NSHPLABSRESULTS_GEN_ALL_CORE
11.7   9.86  )-----------( 363      ( 2018 20:14 )             36.4       -11    134<L>  |  93<L>  |  6<L>  ----------------------------<  166<H>  3.9   |  21  |  0.6<L>    Ca    8.0<L>      2018 22:14    TPro  5.5<L>  /  Alb  2.9<L>  /  TBili  0.5  /  DBili  x   /  AST  24  /  ALT  6   /  AlkPhos  63  -              Urinalysis Basic - ( 2018 19:56 )    Color: Yellow / Appearance: Turbid / S.010 / pH: x  Gluc: x / Ketone: Trace  / Bili: Negative / Urobili: 0.2 mg/dL   Blood: x / Protein: 100 mg/dL / Nitrite: Positive   Leuk Esterase: Large / RBC: 5-10 /HPF / WBC >50 /HPF   Sq Epi: x / Non Sq Epi: Few /HPF / Bacteria: Many /HPF        PT/INR - ( 2018 20:14 )   PT: >40.00 sec;   INR: 3.88 ratio         PTT - ( 2018 20:14 )  PTT:28.5 sec    Lactate Trend      CARDIAC MARKERS ( 2018 01:54 )  x     / <0.01 ng/mL / 24 U/L / x     / 1.1 ng/mL  CARDIAC MARKERS ( 2018 22:14 )  x     / <0.01 ng/mL / 29 U/L / x     / <0.1 ng/mL        CAPILLARY BLOOD GLUCOSE            < from: 12 Lead ECG (18 @ 00:19) >      Diagnosis Line Normal sinus rhythm  Normal ECG    < end of copied text >    < from: CT Head No Cont (18 @ 10:42) >    IMPRESSION:    In comparison with the prior noncontrast CT scan of the brain dated   2018:    Stable examination.    No acute intracranial hemorrhage.    < end of copied text >    < from: Xray Chest 1 View AP/PA (18 @ 21:54) >    mpression:      New small left base opacity    < end of copied text >

## 2018-04-12 NOTE — H&P ADULT - NSHPPHYSICALEXAM_GEN_ALL_CORE
PHYSICAL EXAM:  GENERAL: NAD, speaks in full sentences, no signs of respiratory distress  HEAD:  Atraumatic, Normocephalic  EYES: EOMI, PERRLA, conjunctiva and sclera clear  NECK: Supple, No JVD  CHEST/LUNG: Clear to auscultation bilaterally; No wheeze; No crackles; No accessory muscles used  HEART: Regular rate and rhythm; No murmurs;   ABDOMEN: Soft, Mildly TTP in LLQ , Nondistended; Bowel sounds present; No guarding  EXTREMITIES:  2+ Peripheral Pulses, No cyanosis or edema  PSYCH: AAOx3  NEUROLOGY: non-focal  SKIN: No rashes or lesions PHYSICAL EXAM:  GENERAL: NAD, speaks in full sentences, no signs of respiratory distress  HEAD:  Atraumatic, Normocephalic  EYES: EOMI, PERRLA, conjunctiva and sclera clear  NECK: Supple, No JVD  CHEST/LUNG: Clear to auscultation bilaterally; No wheeze; No crackles; No accessory muscles used  HEART: Regular rate and rhythm; No murmurs;   ABDOMEN: Soft, Mildly TTP in LLQ , Nondistended; Bowel sounds present; No guarding  EXTREMITIES:  2+ Peripheral Pulses, No cyanosis or edema  PSYCH: AAOx3  NEUROLOGY: non-focal, motor strength LE 3-4/5  SKIN: No rashes or lesions

## 2018-04-12 NOTE — H&P ADULT - PROBLEM SELECTOR PLAN 2
- UA+  -send culture, f/u sensitivities  -Patient has had multiple UTI's in the past year, will c/s ID and start on rocephin

## 2018-04-13 LAB
ANION GAP SERPL CALC-SCNC: 15 MMOL/L — HIGH (ref 7–14)
APTT BLD: 36 SEC — SIGNIFICANT CHANGE UP (ref 27–39.2)
BASOPHILS # BLD AUTO: 0.05 K/UL — SIGNIFICANT CHANGE UP (ref 0–0.2)
BASOPHILS NFR BLD AUTO: 0.6 % — SIGNIFICANT CHANGE UP (ref 0–1)
BUN SERPL-MCNC: 5 MG/DL — LOW (ref 10–20)
CALCIUM SERPL-MCNC: 7.9 MG/DL — LOW (ref 8.5–10.1)
CHLORIDE SERPL-SCNC: 91 MMOL/L — LOW (ref 98–110)
CO2 SERPL-SCNC: 28 MMOL/L — SIGNIFICANT CHANGE UP (ref 17–32)
CREAT SERPL-MCNC: 0.5 MG/DL — LOW (ref 0.7–1.5)
EOSINOPHIL # BLD AUTO: 0.16 K/UL — SIGNIFICANT CHANGE UP (ref 0–0.7)
EOSINOPHIL NFR BLD AUTO: 2 % — SIGNIFICANT CHANGE UP (ref 0–8)
GLUCOSE SERPL-MCNC: 79 MG/DL — SIGNIFICANT CHANGE UP (ref 70–99)
HCT VFR BLD CALC: 36 % — LOW (ref 37–47)
HGB BLD-MCNC: 11.2 G/DL — LOW (ref 12–16)
IMM GRANULOCYTES NFR BLD AUTO: 0.5 % — HIGH (ref 0.1–0.3)
INR BLD: 3.01 RATIO — HIGH (ref 0.65–1.3)
LYMPHOCYTES # BLD AUTO: 0.98 K/UL — LOW (ref 1.2–3.4)
LYMPHOCYTES # BLD AUTO: 12.2 % — LOW (ref 20.5–51.1)
MAGNESIUM SERPL-MCNC: 1.6 MG/DL — LOW (ref 1.8–2.4)
MCHC RBC-ENTMCNC: 26 PG — LOW (ref 27–31)
MCHC RBC-ENTMCNC: 31.1 G/DL — LOW (ref 32–37)
MCV RBC AUTO: 83.5 FL — SIGNIFICANT CHANGE UP (ref 81–99)
MONOCYTES # BLD AUTO: 0.92 K/UL — HIGH (ref 0.1–0.6)
MONOCYTES NFR BLD AUTO: 11.5 % — HIGH (ref 1.7–9.3)
NEUTROPHILS # BLD AUTO: 5.88 K/UL — SIGNIFICANT CHANGE UP (ref 1.4–6.5)
NEUTROPHILS NFR BLD AUTO: 73.2 % — SIGNIFICANT CHANGE UP (ref 42.2–75.2)
PLATELET # BLD AUTO: 343 K/UL — SIGNIFICANT CHANGE UP (ref 130–400)
POTASSIUM SERPL-MCNC: 3.6 MMOL/L — SIGNIFICANT CHANGE UP (ref 3.5–5)
POTASSIUM SERPL-SCNC: 3.6 MMOL/L — SIGNIFICANT CHANGE UP (ref 3.5–5)
PROTHROM AB SERPL-ACNC: 33.3 SEC — HIGH (ref 9.95–12.87)
RBC # BLD: 4.31 M/UL — SIGNIFICANT CHANGE UP (ref 4.2–5.4)
RBC # FLD: 19.5 % — HIGH (ref 11.5–14.5)
SODIUM SERPL-SCNC: 134 MMOL/L — LOW (ref 135–146)
WBC # BLD: 8.03 K/UL — SIGNIFICANT CHANGE UP (ref 4.8–10.8)
WBC # FLD AUTO: 8.03 K/UL — SIGNIFICANT CHANGE UP (ref 4.8–10.8)

## 2018-04-13 RX ORDER — DEXTROSE 50 % IN WATER 50 %
25 SYRINGE (ML) INTRAVENOUS ONCE
Qty: 0 | Refills: 0 | Status: DISCONTINUED | OUTPATIENT
Start: 2018-04-13 | End: 2018-04-20

## 2018-04-13 RX ORDER — GLUCAGON INJECTION, SOLUTION 0.5 MG/.1ML
1 INJECTION, SOLUTION SUBCUTANEOUS ONCE
Qty: 0 | Refills: 0 | Status: DISCONTINUED | OUTPATIENT
Start: 2018-04-13 | End: 2018-04-20

## 2018-04-13 RX ORDER — DEXTROSE 50 % IN WATER 50 %
1 SYRINGE (ML) INTRAVENOUS ONCE
Qty: 0 | Refills: 0 | Status: DISCONTINUED | OUTPATIENT
Start: 2018-04-13 | End: 2018-04-20

## 2018-04-13 RX ORDER — DEXTROSE 50 % IN WATER 50 %
12.5 SYRINGE (ML) INTRAVENOUS ONCE
Qty: 0 | Refills: 0 | Status: DISCONTINUED | OUTPATIENT
Start: 2018-04-13 | End: 2018-04-20

## 2018-04-13 RX ORDER — CEFTRIAXONE 500 MG/1
1 INJECTION, POWDER, FOR SOLUTION INTRAMUSCULAR; INTRAVENOUS EVERY 24 HOURS
Qty: 0 | Refills: 0 | Status: DISCONTINUED | OUTPATIENT
Start: 2018-04-14 | End: 2018-04-14

## 2018-04-13 RX ORDER — INSULIN GLARGINE 100 [IU]/ML
10 INJECTION, SOLUTION SUBCUTANEOUS AT BEDTIME
Qty: 0 | Refills: 0 | Status: DISCONTINUED | OUTPATIENT
Start: 2018-04-13 | End: 2018-04-20

## 2018-04-13 RX ORDER — MAGNESIUM SULFATE 500 MG/ML
2 VIAL (ML) INJECTION ONCE
Qty: 0 | Refills: 0 | Status: COMPLETED | OUTPATIENT
Start: 2018-04-13 | End: 2018-04-13

## 2018-04-13 RX ORDER — INSULIN LISPRO 100/ML
VIAL (ML) SUBCUTANEOUS
Qty: 0 | Refills: 0 | Status: DISCONTINUED | OUTPATIENT
Start: 2018-04-13 | End: 2018-04-20

## 2018-04-13 RX ORDER — SODIUM CHLORIDE 9 MG/ML
1000 INJECTION, SOLUTION INTRAVENOUS
Qty: 0 | Refills: 0 | Status: DISCONTINUED | OUTPATIENT
Start: 2018-04-13 | End: 2018-04-20

## 2018-04-13 RX ORDER — INSULIN LISPRO 100/ML
3 VIAL (ML) SUBCUTANEOUS
Qty: 0 | Refills: 0 | Status: DISCONTINUED | OUTPATIENT
Start: 2018-04-13 | End: 2018-04-20

## 2018-04-13 RX ORDER — CEFTRIAXONE 500 MG/1
INJECTION, POWDER, FOR SOLUTION INTRAMUSCULAR; INTRAVENOUS
Qty: 0 | Refills: 0 | Status: DISCONTINUED | OUTPATIENT
Start: 2018-04-13 | End: 2018-04-14

## 2018-04-13 RX ORDER — CEFTRIAXONE 500 MG/1
1 INJECTION, POWDER, FOR SOLUTION INTRAMUSCULAR; INTRAVENOUS ONCE
Qty: 0 | Refills: 0 | Status: COMPLETED | OUTPATIENT
Start: 2018-04-13 | End: 2018-04-13

## 2018-04-13 RX ADMIN — Medication 1 TABLET(S): at 12:26

## 2018-04-13 RX ADMIN — Medication 50 GRAM(S): at 12:06

## 2018-04-13 RX ADMIN — CEFTRIAXONE 100 GRAM(S): 500 INJECTION, POWDER, FOR SOLUTION INTRAMUSCULAR; INTRAVENOUS at 11:36

## 2018-04-13 RX ADMIN — GABAPENTIN 200 MILLIGRAM(S): 400 CAPSULE ORAL at 12:26

## 2018-04-13 RX ADMIN — Medication 1000 MILLIGRAM(S): at 18:13

## 2018-04-13 RX ADMIN — Medication 25 MICROGRAM(S): at 06:48

## 2018-04-13 RX ADMIN — Medication 1000 MILLIGRAM(S): at 06:47

## 2018-04-13 NOTE — PROGRESS NOTE ADULT - SUBJECTIVE AND OBJECTIVE BOX
Patient is a 89y old  Female who presents with a chief complaint of syncope     PAST MEDICAL & SURGICAL HISTORY:  MI (myocardial infarction)  TIA (transient ischemic attack)  Hernia  DVT (deep venous thrombosis)  Arthritis  Hypothyroid  DM (diabetes mellitus)  History of appendectomy  H/O cornea transplant  H/O hysterectomy for benign disease  History of lumpectomy of right breast      Overnight events:  No overnight events    Vital Signs Last 24 Hrs  T(C): 37 (2018 00:13), Max: 37 (2018 00:13)  T(F): 98.6 (2018 00:13), Max: 98.6 (2018 00:13)  HR: 86 (2018 00:13) (86 - 93)  BP: 104/53 (2018 00:13) (104/53 - 130/71)  BP(mean): --  RR: 19 (2018 00:13) (17 - 19)  SpO2: 95% (2018 00:13) (95% - 97%)  CAPILLARY BLOOD GLUCOSE        I&O's Summary      Physical Exam:    -     General : The patient is a well-developed, well-nourished in no apparent distress.    -      HEENT: Head is normocephalic and atraumatic. Extraocular muscles are intact. Pupils are equal, round, and reactive to light and accommodation. Nares appeared normal. Mouth is well hydrated and without lesions. Mucous membranes are moist. Posterior pharynx clear of any exudate or lesions.    -      Cardiac: Regular rate and rhythem. Normal S1 and S2, no murmur, gallop or rubs    -      Pulm:  Clear to auscultation.    -      GI: Soft, nontender, and nondistended.  Positive bowel sounds.  No hepatosplenomegaly was noted.    -       EXTREMITIES : Without any cyanosis, clubbing, rash, lesions or edema    -      Neuro: Patient is Alert and oriented x 3. Cranial nerves II through XII are grossly intact.        MEDICATIONS  (STANDING):  acetaminophen   Tablet. 1000 milliGRAM(s) Oral two times a day  calcium carbonate 1250 mG + Vitamin D (OsCal 500 + D) 1 Tablet(s) Oral daily  chlorproPAMIDE. 50 milliGRAM(s) Oral daily  gabapentin 200 milliGRAM(s) Oral daily  levothyroxine 25 MICROGram(s) Oral daily    MEDICATIONS  (PRN):  .  Home Medications:  chlorproPAMIDE: 50 milligram(s) orally once a day (2018 15:28)  gabapentin 100 mg oral capsule: 1 cap(s) orally once a day (2018 15:31)  levothyroxine 25 mcg (0.025 mg) oral tablet: 1 tab(s) orally once a day (2018 15:31)  predniSONE 2.5 mg oral tablet: 1 tab(s) orally once a day (2018 15:31)  Tylenol 500 mg oral tablet: 2 tab(s) orally 2 times a day (2018 15:27)      Labs:                        11.2   8.03  )-----------( 343      ( 2018 07:09 )             36.0             04-11    134<L>  |  93<L>  |  6<L>  ----------------------------<  166<H>  3.9   |  21  |  0.6<L>    Ca    8.0<L>      2018 22:14    TPro  5.5<L>  /  Alb  2.9<L>  /  TBili  0.5  /  DBili  x   /  AST  24  /  ALT  6   /  AlkPhos  63  04-11    LIVER FUNCTIONS - ( 2018 22:14 )  Alb: 2.9 g/dL / Pro: 5.5 g/dL / ALK PHOS: 63 U/L / ALT: 6 U/L / AST: 24 U/L / GGT: x                 PT/INR - ( 2018 21:32 )   PT: 33.40 sec;   INR: 3.02 ratio         PTT - ( 2018 21:32 )  PTT:37.8 sec  CARDIAC MARKERS ( 2018 01:54 )  x     / <0.01 ng/mL / 24 U/L / x     / 1.1 ng/mL  CARDIAC MARKERS ( 2018 22:14 )  x     / <0.01 ng/mL / 29 U/L / x     / <0.1 ng/mL        Urinalysis Basic - ( 2018 19:56 )    Color: Yellow / Appearance: Turbid / S.010 / pH: x  Gluc: x / Ketone: Trace  / Bili: Negative / Urobili: 0.2 mg/dL   Blood: x / Protein: 100 mg/dL / Nitrite: Positive   Leuk Esterase: Large / RBC: 5-10 /HPF / WBC >50 /HPF   Sq Epi: x / Non Sq Epi: Few /HPF / Bacteria: Many /HPF              	< from: 12 Lead ECG (18 @ 00:19) >      	Diagnosis Line Normal sinus rhythm  	Normal ECG        	< from: CT Head No Cont (18 @ 10:42) >    	IMPRESSION:    	In comparison with the prior noncontrast CT scan of the brain dated   	2018:    	Stable examination.    	No acute intracranial hemorrhage.        	< from: Xray Chest 1 View AP/PA (18 @ 21:54) >    	Impression:      	New small left base opacity Patient is a 89y old  Female who presents with a chief complaint of syncope and weakness    PAST MEDICAL & SURGICAL HISTORY:  MI (myocardial infarction)  TIA (transient ischemic attack)  Hernia  DVT (deep venous thrombosis)  Arthritis  Hypothyroid  DM (diabetes mellitus)  History of appendectomy  H/O cornea transplant  H/O hysterectomy for benign disease  History of lumpectomy of right breast      Overnight events:  No overnight events  patient still feels weak when she stand    Vital Signs Last 24 Hrs  T(C): 36.3 (2018 08:33), Max: 37 (2018 00:13)  T(F): 97.4 (2018 08:33), Max: 98.6 (2018 00:13)  HR: 89 (2018 08:33) (86 - 89)  BP: 134/56 (2018 08:33) (104/53 - 134/56)  BP(mean): --  RR: 18 (2018 08:33) (18 - 19)  SpO2: 96% (2018 08:33) (95% - 97%)  CAPILLARY BLOOD GLUCOSE        I&O's Summary      Physical Exam:    -     General : The patient is a well-developed, well-nourished in no apparent distress.    -      HEENT: Head is normocephalic and atraumatic. Extraocular muscles are intact. Pupils are equal, round, and reactive to light and accommodation. Nares appeared normal. Mouth is well hydrated and without lesions. Mucous membranes are moist. Posterior pharynx clear of any exudate or lesions.    -      Cardiac: Regular rate and rhythem. Normal S1 and S2, no murmur, gallop or rubs    -      Pulm:  Clear to auscultation.    -      GI: Soft, nontender, and nondistended.  Positive bowel sounds.  No hepatosplenomegaly was noted.    -       EXTREMITIES : Without any cyanosis, clubbing, rash, lesions or edema    -      Neuro: Patient is Alert and oriented x 3.         MEDICATIONS  (STANDING):  acetaminophen   Tablet. 1000 milliGRAM(s) Oral two times a day  calcium carbonate 1250 mG + Vitamin D (OsCal 500 + D) 1 Tablet(s) Oral daily  cefTRIAXone   IVPB      dextrose 5%. 1000 milliLiter(s) (50 mL/Hr) IV Continuous <Continuous>  dextrose 50% Injectable 12.5 Gram(s) IV Push once  dextrose 50% Injectable 25 Gram(s) IV Push once  dextrose 50% Injectable 25 Gram(s) IV Push once  gabapentin 200 milliGRAM(s) Oral daily  insulin glargine Injectable (LANTUS) 10 Unit(s) SubCutaneous at bedtime  insulin lispro (HumaLOG) corrective regimen sliding scale   SubCutaneous three times a day before meals  insulin lispro Injectable (HumaLOG) 3 Unit(s) SubCutaneous three times a day before meals  levothyroxine 25 MICROGram(s) Oral daily    MEDICATIONS  (PRN):  dextrose Gel 1 Dose(s) Oral once PRN Blood Glucose LESS THAN 70 milliGRAM(s)/deciliter  glucagon  Injectable 1 milliGRAM(s) IntraMuscular once PRN Glucose LESS THAN 70 milligrams/deciliter  .  Home Medications:  chlorproPAMIDE: 50 milligram(s) orally once a day (2018 15:28)  gabapentin 100 mg oral capsule: 1 cap(s) orally once a day (2018 15:31)  levothyroxine 25 mcg (0.025 mg) oral tablet: 1 tab(s) orally once a day (2018 15:31)  predniSONE 2.5 mg oral tablet: 1 tab(s) orally once a day (2018 15:31)  Tylenol 500 mg oral tablet: 2 tab(s) orally 2 times a day (2018 15:27)      Labs:                        11.2   8.03  )-----------( 343      ( 2018 07:09 )             36.0             04-13    134<L>  |  91<L>  |  5<L>  ----------------------------<  79  3.6   |  28  |  0.5<L>    Ca    7.9<L>      2018 07:09  Mg     1.6     04-13    TPro  5.5<L>  /  Alb  2.9<L>  /  TBili  0.5  /  DBili  x   /  AST  24  /  ALT  6   /  AlkPhos  63  04-11    LIVER FUNCTIONS - ( 2018 22:14 )  Alb: 2.9 g/dL / Pro: 5.5 g/dL / ALK PHOS: 63 U/L / ALT: 6 U/L / AST: 24 U/L / GGT: x                 PT/INR - ( 2018 07:09 )   PT: 33.30 sec;   INR: 3.01 ratio         PTT - ( 2018 07:09 )  PTT:36.0 sec  CARDIAC MARKERS ( 2018 01:54 )  x     / <0.01 ng/mL / 24 U/L / x     / 1.1 ng/mL  CARDIAC MARKERS ( 2018 22:14 )  x     / <0.01 ng/mL / 29 U/L / x     / <0.1 ng/mL        Urinalysis Basic - ( 2018 19:56 )    Color: Yellow / Appearance: Turbid / S.010 / pH: x  Gluc: x / Ketone: Trace  / Bili: Negative / Urobili: 0.2 mg/dL   Blood: x / Protein: 100 mg/dL / Nitrite: Positive   Leuk Esterase: Large / RBC: 5-10 /HPF / WBC >50 /HPF   Sq Epi: x / Non Sq Epi: Few /HPF / Bacteria: Many /HPF            	< from: 12 Lead ECG (18 @ 00:19) >      	Diagnosis Line Normal sinus rhythm  	Normal ECG        	< from: CT Head No Cont (18 @ 10:42) >    	IMPRESSION:    	In comparison with the prior noncontrast CT scan of the brain dated   	2018:    	Stable examination.    	No acute intracranial hemorrhage.        	< from: Xray Chest 1 View AP/PA (18 @ 21:54) >    	Impression:      	New small left base opacity      < from: Transthoracic Echocardiogram (18 @ 09:54) >  Summary:   1. Left ventricular ejection fraction, by visual estimation, is 50 to   55%.   2. Normal global left ventricular systolic function.   3. Spectral Doppler shows impaired relaxation pattern of left   ventricular myocardial filling (Grade I diastolic dysfunction).   4. Mild mitral valve regurgitation.

## 2018-04-13 NOTE — CONSULT NOTE ADULT - SUBJECTIVE AND OBJECTIVE BOX
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 As per daughter, patient has been feeling weak for the past few days, unable to get out of bed. Yesterday after 3 days, patient was able to sit in a chair when she felt like her neck was hurting and then as per son, she was mumbling and felt cold and clammy. She then slumped her chin down to her chest and syncopized for less than a minute. After a couple of minutes, she was back to her baseline and able to respond to her family normally. Her daughter said during the episode that she noticed a period of mild brief shaking which went away. She denied any foaming at the mouth, tongue biting and patient is incontinent at baseline.  Denies fevers, CP, SOB, abdominal pain, N/V/D. Admits to dysuria- incontinent at baseline, wears diapers. As per daughter, completed her recent course of abx for UTI.  Patient has been in and out of the hospital several times in the past few months for temporal arteritis, UTIs, URI so family is unsure whether her weakness is related to that or general deconditioning. (2018 15:17)      PAST MEDICAL & SURGICAL HISTORY:  MI (myocardial infarction)  TIA (transient ischemic attack)  Hernia  DVT (deep venous thrombosis)  Arthritis  Hypothyroid  DM (diabetes mellitus)  History of appendectomy  H/O cornea transplant  H/O hysterectomy for benign disease  History of lumpectomy of right breast      Hospital Course: EKG NSR, CE neg x 2, EEG pending. Hospital course complicated by UTI.  neuro to r/o seizure, Patient has had multiple UTI's in the past year, will c/s ID and start on rocephin.  TODAY'S SUBJECTIVE & REVIEW OF SYMPTOMS:     Constitutional weakness   Cardio WNL   Resp WNL   GI WNL  Heme WNL  Endo WNL  Skin WNL  MSK WNL  Neuro WNL  Cognitive WNL  Psych WNL  CHronic incontinence urine    MEDICATIONS  (STANDING):  acetaminophen   Tablet. 1000 milliGRAM(s) Oral two times a day  calcium carbonate 1250 mG + Vitamin D (OsCal 500 + D) 1 Tablet(s) Oral daily  cefTRIAXone   IVPB      dextrose 5%. 1000 milliLiter(s) (50 mL/Hr) IV Continuous <Continuous>  dextrose 50% Injectable 12.5 Gram(s) IV Push once  dextrose 50% Injectable 25 Gram(s) IV Push once  dextrose 50% Injectable 25 Gram(s) IV Push once  gabapentin 200 milliGRAM(s) Oral daily  insulin glargine Injectable (LANTUS) 10 Unit(s) SubCutaneous at bedtime  insulin lispro (HumaLOG) corrective regimen sliding scale   SubCutaneous three times a day before meals  insulin lispro Injectable (HumaLOG) 3 Unit(s) SubCutaneous three times a day before meals  levothyroxine 25 MICROGram(s) Oral daily    MEDICATIONS  (PRN):  dextrose Gel 1 Dose(s) Oral once PRN Blood Glucose LESS THAN 70 milliGRAM(s)/deciliter  glucagon  Injectable 1 milliGRAM(s) IntraMuscular once PRN Glucose LESS THAN 70 milligrams/deciliter      FAMILY HISTORY:      Allergies    Bactrim (Unknown)  Cipro (Unknown)  fluoroquinolone antibiotics (Other (Severe))  methenamine (Unknown)  nitrofurantoin (Unknown)  sulfa drugs (Unknown)    Intolerances        SOCIAL HISTORY:    [    ] Etoh  [    ] Smoking  [    ] Substance abuse     Home Environment:  [    ] Home Alone  [  x  ] Lives with Family  [    ] Home Health Aid    Dwelling:  [    ] Apartment  [x   ] Private House  [    ] Adult Home  [    ] Skilled Nursing Facility      [    ] Short Term  [    ] Long Term  [  x  ] Stairs   NONE                        [    ] Elevator     FUNCTIONAL STATUS PTA: (Check all that apply)  Ambulation: [     ]Independent    [ x   ] Dependent     [    ] Non-Ambulatory  Assistive Device: [    ] SA Cane  [    ]  Q Cane  [    ] Walker  [ x   ]  Wheelchair  ADL : [    ] Independent  [  x  ]  Dependent       Vital Signs Last 24 Hrs  T(C): 36.3 (2018 08:33), Max: 37 (2018 00:13)  T(F): 97.4 (2018 08:33), Max: 98.6 (2018 00:13)  HR: 89 (2018 08:33) (86 - 89)  BP: 134/56 (2018 08:33) (104/53 - 134/56)  BP(mean): --  RR: 18 (2018 08:33) (18 - 19)  SpO2: 96% (2018 08:33) (95% - 97%)      PHYSICAL EXAM: Alert & Oriented X3 depressed about inability to walk  GENERAL: NAD, well-groomed, well-developed  HEAD:  Atraumatic, Normocephalic  EYES: EOMI, PERRLA, conjunctiva and sclera clear  NECK: Supple, No JVD, Normal thyroid  CHEST/LUNG: Clear to percussion bilaterally; No rales, rhonchi, wheezing, or rubs  HEART: Regular rate and rhythm; No murmurs, rubs, or gallops  ABDOMEN: Soft, Nontender, Nondistended; Bowel sounds present  EXTREMITIES:  2+ Peripheral Pulses, No clubbing, cyanosis, or edema    NERVOUS SYSTEM:  Cranial Nerves 2-12 intact [  x  ] Abnormal  [    ]  ROM: WFL all extremities [x    ]  Abnormal [     ]  Motor Strength: WFL all extremities  [    ]  Abnormal [  x  ]3-/5  Sensation: intact to light touch [ x   ] Abnormal [    ]      FUNCTIONAL STATUS:  Bed Mobility: [   ]  Independent [    ]  Supervision [  x  ]  Needs Assistance [  ]  N/A  Transfers: [    ]  Independent [    ]  Supervision [    ]  Needs Assistance [ x   ]  N/A    Ambulation:  [    ]  Independent [    ]  Supervision [    ]  Needs Assistance [  x  ]  N/A   ADL:  [    ]   Independent [  x  ] Requires Assistance [    ] N/A     CURRENTLY AT [facility]- [facility]  LABS:                        11.2   8.03  )-----------( 343      ( 2018 07:09 )             36.0     04-13    134<L>  |  91<L>  |  5<L>  ----------------------------<  79  3.6   |  28  |  0.5<L>    Ca    7.9<L>      2018 07:09  Mg     1.6     04-13    TPro  5.5<L>  /  Alb  2.9<L>  /  TBili  0.5  /  DBili  x   /  AST  24  /  ALT  6   /  AlkPhos  63  04-11    PT/INR - ( 2018 07:09 )   PT: 33.30 sec;   INR: 3.01 ratio         PTT - ( 2018 07:09 )  PTT:36.0 sec  Urinalysis Basic - ( 2018 19:56 )    Color: Yellow / Appearance: Turbid / S.010 / pH: x  Gluc: x / Ketone: Trace  / Bili: Negative / Urobili: 0.2 mg/dL   Blood: x / Protein: 100 mg/dL / Nitrite: Positive   Leuk Esterase: Large / RBC: 5-10 /HPF / WBC >50 /HPF   Sq Epi: x / Non Sq Epi: Few /HPF / Bacteria: Many /HPF        RADIOLOGY & ADDITIONAL STUDIES:

## 2018-04-13 NOTE — PROGRESS NOTE ADULT - ASSESSMENT
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 CT head was stable, EKG NSR, CE neg x 2, EEG pending. Hospital course complicated by UTI.     # Syncope.    Plan:   - admit to tele   - CE neg x 2, EKG NSR,   -2d echo pending   -check tsh, b12, folate.   - f/u routine EEG   -Neurology eval        # Urinary tract infection.    Plan:   - UA+  -send culture, f/u sensitivities  -Patient has had multiple UTI's in the past year, will c/s ID and start on rocephin.     # DVT (deep venous thrombosis).    Plan:   -stable   -INR supra therapeutic yesterday   -follow up INR and dose coumadin.     #Hypothyroid.    Plan:   -check TSH  -c/w home dose synthroid.     # DM (diabetes mellitus).    Plan:   -started on basal bolus insulin   -fs ac  qhs  -cc diet.       #Temporal arteritis.    Plan:   -c/w prednisone taper  -patient never had official bx.       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 CT head was stable, EKG NSR, CE neg x 2, EEG pending. Hospital course complicated by UTI.     # Syncope.    Plan:   - admit to tele   -orthostatic vitals  - CE neg x 2, EKG NSR,   -2d echo EF NL, grade 1 DD   -check tsh, b12, folate.   - f/u routine EEG   -Neurology eval        # Urinary tract infection.    Plan:   - UA+  -send culture, f/u sensitivities  -Patient has had multiple UTI's in the past year, will c/s ID and start on rocephin.     # DVT (deep venous thrombosis).    Plan:   -stable   -INR supra therapeutic yesterday   -follow up INR and dose coumadin.     #Hypothyroid.    Plan:   -check TSH  -c/w home dose synthroid.     # DM (diabetes mellitus).    Plan:   -started on basal bolus insulin   -fs ac  qhs  -cc diet.       #Temporal arteritis.    Plan:   -prednisone course completed        #Other  DASH diet  From home  Full code

## 2018-04-13 NOTE — CONSULT NOTE ADULT - ASSESSMENT
IMPRESSION: Rehab of Debilitation sp syncope/UTI- generalized le weakness ? secondary to steroids    PRECAUTIONS: [  x  ] Cardiac  [    ] Respiratory  [  x  ] Seizures [    ] Contact Isolation  [    ] Droplet Isolation  [    ] Other    Weight Bearing Status:     RECOMMENDATION: NEED OOB ORDERS - CHECK ORTHOSTATICS    Out of Bed to Chair     DVT/Decubiti Prophylaxis    REHAB PLAN:     [   x  ] Bedside P/T 3-5 times a week   [     ] Bedside O/T  2-3 times a week   [     ] No Rehab Therapy Indicated   [     ]  Speech Therapy   Conditioning/ROM                                 ADL  Bed Mobility                                            Conditioning/ROM  Transfers                                                  Bed Mobility  Sitting /Standing Balance                      Transfers                                        Gait Training                                            Sitting/Standing Balance  Stair Training [   ]Applicable                 Home equipment Eval                                                                     Splinting  [   ] Only      GOALS:   ADL   [    ]   Independent         Transfers  [    ] Independent            Ambulation  [     ] Independent     [     ] With device                            [x    ]  CG                                               [   x ]  CG                                                    [ x    ] CG                            [    ] Min A                                          [    ] Min A                                                [     ] Min  A          DISCHARGE PLAN:   [     ]  Good candidate for Intensive Rehabilitation/Hospital based-[facility]                                             Will tolerate 3hrs Intensive Rehab Daily                                       [      ]  Short Term Rehab in Skilled Nursing Facility                                       [    x  ]  Home with Outpatient or  services  FAMILY REFUSES SNF FOR [facility]                                         [      ]  Possible Candidate for Intensive Hospital based Rehab

## 2018-04-14 LAB
ANION GAP SERPL CALC-SCNC: 12 MMOL/L — SIGNIFICANT CHANGE UP (ref 7–14)
BASOPHILS # BLD AUTO: 0.05 K/UL — SIGNIFICANT CHANGE UP (ref 0–0.2)
BASOPHILS NFR BLD AUTO: 0.9 % — SIGNIFICANT CHANGE UP (ref 0–1)
BUN SERPL-MCNC: 5 MG/DL — LOW (ref 10–20)
CALCIUM SERPL-MCNC: 7.7 MG/DL — LOW (ref 8.5–10.1)
CHLORIDE SERPL-SCNC: 94 MMOL/L — LOW (ref 98–110)
CO2 SERPL-SCNC: 28 MMOL/L — SIGNIFICANT CHANGE UP (ref 17–32)
CREAT SERPL-MCNC: 0.5 MG/DL — LOW (ref 0.7–1.5)
EOSINOPHIL # BLD AUTO: 0.17 K/UL — SIGNIFICANT CHANGE UP (ref 0–0.7)
EOSINOPHIL NFR BLD AUTO: 3.1 % — SIGNIFICANT CHANGE UP (ref 0–8)
ESTIMATED AVERAGE GLUCOSE: 114 MG/DL — SIGNIFICANT CHANGE UP (ref 68–114)
FOLATE SERPL-MCNC: 2.9 NG/ML — LOW (ref 4.8–24.2)
GLUCOSE SERPL-MCNC: 80 MG/DL — SIGNIFICANT CHANGE UP (ref 70–99)
HBA1C BLD-MCNC: 5.6 % — SIGNIFICANT CHANGE UP (ref 4–5.6)
HCT VFR BLD CALC: 33.3 % — LOW (ref 37–47)
HGB BLD-MCNC: 10.7 G/DL — LOW (ref 12–16)
IMM GRANULOCYTES NFR BLD AUTO: 0.6 % — HIGH (ref 0.1–0.3)
INR BLD: 2.69 RATIO — HIGH (ref 0.65–1.3)
LYMPHOCYTES # BLD AUTO: 1.24 K/UL — SIGNIFICANT CHANGE UP (ref 1.2–3.4)
LYMPHOCYTES # BLD AUTO: 22.8 % — SIGNIFICANT CHANGE UP (ref 20.5–51.1)
MAGNESIUM SERPL-MCNC: 2 MG/DL — SIGNIFICANT CHANGE UP (ref 1.8–2.4)
MCHC RBC-ENTMCNC: 26.4 PG — LOW (ref 27–31)
MCHC RBC-ENTMCNC: 32.1 G/DL — SIGNIFICANT CHANGE UP (ref 32–37)
MCV RBC AUTO: 82 FL — SIGNIFICANT CHANGE UP (ref 81–99)
MONOCYTES # BLD AUTO: 0.8 K/UL — HIGH (ref 0.1–0.6)
MONOCYTES NFR BLD AUTO: 14.7 % — HIGH (ref 1.7–9.3)
NEUTROPHILS # BLD AUTO: 3.16 K/UL — SIGNIFICANT CHANGE UP (ref 1.4–6.5)
NEUTROPHILS NFR BLD AUTO: 57.9 % — SIGNIFICANT CHANGE UP (ref 42.2–75.2)
NRBC # BLD: 0 /100 WBCS — SIGNIFICANT CHANGE UP (ref 0–0)
PLATELET # BLD AUTO: 342 K/UL — SIGNIFICANT CHANGE UP (ref 130–400)
POTASSIUM SERPL-MCNC: 3.5 MMOL/L — SIGNIFICANT CHANGE UP (ref 3.5–5)
POTASSIUM SERPL-SCNC: 3.5 MMOL/L — SIGNIFICANT CHANGE UP (ref 3.5–5)
PROTHROM AB SERPL-ACNC: 29.6 SEC — HIGH (ref 9.95–12.87)
RBC # BLD: 4.06 M/UL — LOW (ref 4.2–5.4)
RBC # FLD: 19.5 % — HIGH (ref 11.5–14.5)
SODIUM SERPL-SCNC: 134 MMOL/L — LOW (ref 135–146)
TSH SERPL-MCNC: 1.38 UIU/ML — SIGNIFICANT CHANGE UP (ref 0.27–4.2)
VIT B12 SERPL-MCNC: 886 PG/ML — SIGNIFICANT CHANGE UP (ref 232–1245)
WBC # BLD: 5.45 K/UL — SIGNIFICANT CHANGE UP (ref 4.8–10.8)
WBC # FLD AUTO: 5.45 K/UL — SIGNIFICANT CHANGE UP (ref 4.8–10.8)

## 2018-04-14 RX ORDER — CEFEPIME 1 G/1
1000 INJECTION, POWDER, FOR SOLUTION INTRAMUSCULAR; INTRAVENOUS EVERY 12 HOURS
Qty: 0 | Refills: 0 | Status: DISCONTINUED | OUTPATIENT
Start: 2018-04-15 | End: 2018-04-20

## 2018-04-14 RX ORDER — CEFEPIME 1 G/1
INJECTION, POWDER, FOR SOLUTION INTRAMUSCULAR; INTRAVENOUS
Qty: 0 | Refills: 0 | Status: DISCONTINUED | OUTPATIENT
Start: 2018-04-14 | End: 2018-04-20

## 2018-04-14 RX ORDER — CEFEPIME 1 G/1
1000 INJECTION, POWDER, FOR SOLUTION INTRAMUSCULAR; INTRAVENOUS ONCE
Qty: 0 | Refills: 0 | Status: COMPLETED | OUTPATIENT
Start: 2018-04-14 | End: 2018-04-14

## 2018-04-14 RX ADMIN — CEFEPIME 100 MILLIGRAM(S): 1 INJECTION, POWDER, FOR SOLUTION INTRAMUSCULAR; INTRAVENOUS at 13:54

## 2018-04-14 RX ADMIN — Medication 25 MICROGRAM(S): at 05:30

## 2018-04-14 RX ADMIN — GABAPENTIN 200 MILLIGRAM(S): 400 CAPSULE ORAL at 11:08

## 2018-04-14 RX ADMIN — Medication 1 TABLET(S): at 11:08

## 2018-04-14 NOTE — PROGRESS NOTE ADULT - SUBJECTIVE AND OBJECTIVE BOX
Pt seen and examined. Marybeth at bedside. Denies seizure    T(F): , Max: 98.3 (04-14-18 @ 14:41)  HR: 101 (04-14-18 @ 14:41) (91 - 101)  BP: 108/66 (04-14-18 @ 14:41)  RR: 18 (04-14-18 @ 14:41)  SpO2: --62.9  General: No apparent distress, poor dentition  Cardiovascular: S1, S2  Gastrointestinal: Soft, Non-tender, Non-distended  Respiratory: Good air entry bilaterally  Musculoskeletal: Moves all extremities  Lymphatic: No edema  Neurologic: No gross motor deficit  Dermatologic: Skin dry  PT/INR - ( 14 Apr 2018 06:47 )   PT: 29.60 sec;   INR: 2.69 ratio         PTT - ( 13 Apr 2018 07:09 )  PTT:36.0 sec                        10.7   5.45  )-----------( 342      ( 14 Apr 2018 06:47 )             33.3     04-14    134<L>  |  94<L>  |  5<L>  ----------------------------<  80  3.5   |  28  |  0.5<L>    Ca    7.7<L>      14 Apr 2018 06:47  Mg     2.0     04-14

## 2018-04-14 NOTE — PROGRESS NOTE ADULT - ASSESSMENT
Syncope  -orthostatic vitals  -Neurology following  -EEG    Urinary tract infection.  -outisde lab result provided. Pseudomonas sens to Cefepime. Multi drug allergic pt.  -ID F/U    Hyponatremia  - monitor    DVT  - coumadin.     Hypothyroid  -c/w home dose synthroid.     DM    -ssi      Temporal arteritis.    -prednisone course completed

## 2018-04-14 NOTE — PATIENT PROFILE ADULT. - ABILITY TO HEAR (WITH HEARING AID OR HEARING APPLIANCE IF NORMALLY USED):
Mildly to Moderately Impaired: difficulty hearing in some environments or speaker may need to increase volume or speak distinctly/decreased right ear

## 2018-04-14 NOTE — PROGRESS NOTE ADULT - SUBJECTIVE AND OBJECTIVE BOX
Neurology Consult    Patient is a 89y old  Female who presents with a chief complaint of syncope.    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 As per daughter, patient has been feeling weak for the past few days, unable to get out of bed. Yesterday after 3 days, patient was able to sit in a chair when she felt like her neck was hurting and then as per son, she was mumbling and felt cold and clammy. She then slumped her chin down to her chest and syncopized for less than a minute. After a couple of minutes, she was back to her baseline and able to respond to her family normally. Her daughter said during the episode that she noticed a period of mild brief shaking which went away. She denied any foaming at the mouth, tongue biting and patient is incontinent at baseline.  Denies fevers, CP, SOB, abdominal pain, N/V/D. Admits to dysuria- incontinent at baseline, wears diapers. As per daughter, completed her recent course of abx for UTI.  Patient has been in and out of the hospital several times in the past few months for temporal arteritis, UTIs, URI so family is unsure whether her weakness is related to that or general deconditioning. (12 Apr 2018 15:17)      PAST MEDICAL & SURGICAL HISTORY:  MI (myocardial infarction)  TIA (transient ischemic attack)  Hernia  DVT (deep venous thrombosis)  Arthritis  Hypothyroid  DM (diabetes mellitus)  History of appendectomy  H/O cornea transplant  H/O hysterectomy for benign disease  History of lumpectomy of right breast      FAMILY HISTORY:      Social History: (-) x 3    Allergies    Bactrim (Unknown)  Cipro (Unknown)  fluoroquinolone antibiotics (Other (Severe))  Levaquin (Other)  Macrobid (Unknown)  methenamine (Unknown)  nitrofurantoin (Unknown)  penicillin (Unknown)  sulfa drugs (Unknown)    Intolerances        MEDICATIONS  (STANDING):  acetaminophen   Tablet. 1000 milliGRAM(s) Oral two times a day  calcium carbonate 1250 mG + Vitamin D (OsCal 500 + D) 1 Tablet(s) Oral daily  cefTRIAXone   IVPB 1 Gram(s) IV Intermittent every 24 hours  cefTRIAXone   IVPB      dextrose 5%. 1000 milliLiter(s) (50 mL/Hr) IV Continuous <Continuous>  dextrose 50% Injectable 12.5 Gram(s) IV Push once  dextrose 50% Injectable 25 Gram(s) IV Push once  dextrose 50% Injectable 25 Gram(s) IV Push once  gabapentin 200 milliGRAM(s) Oral daily  insulin glargine Injectable (LANTUS) 10 Unit(s) SubCutaneous at bedtime  insulin lispro (HumaLOG) corrective regimen sliding scale   SubCutaneous three times a day before meals  insulin lispro Injectable (HumaLOG) 3 Unit(s) SubCutaneous three times a day before meals  levothyroxine 25 MICROGram(s) Oral daily    MEDICATIONS  (PRN):  dextrose Gel 1 Dose(s) Oral once PRN Blood Glucose LESS THAN 70 milliGRAM(s)/deciliter  glucagon  Injectable 1 milliGRAM(s) IntraMuscular once PRN Glucose LESS THAN 70 milligrams/deciliter      Review of systems:    neck pain      Vital Signs Last 24 Hrs  T(C): 36.2 (13 Apr 2018 16:04), Max: 36.3 (13 Apr 2018 08:33)  T(F): 97.1 (13 Apr 2018 16:04), Max: 97.4 (13 Apr 2018 08:33)  HR: 94 (13 Apr 2018 16:04) (89 - 94)  BP: 120/58 (13 Apr 2018 16:04) (120/58 - 134/56)  BP(mean): --  RR: 18 (13 Apr 2018 16:04) (18 - 18)  SpO2: 95% (13 Apr 2018 16:04) (95% - 96%)    Neurologic Examination:  General:  Appearance is consistent with chronologic age.    General: patient's speech is fluent.  She is nondysarthric.    Cranial nerves: intact VA, VFF.  EOMI w/o nystagmus, skew or reported double vision.  PERRL.  No ptosis/weakness of eyelid closure.  Facial sensation is normal with normal bite.  No facial asymmetry.  Hearing grossly intact b/l.  Palate elevates midline.  Tongue midline.  Motor examination:   Normal tone, bulk and range of motion.  No tenderness, twitching, tremors or involuntary movements.  Formal Muscle Strength Testing: (MRC grade R/L) 5-/5 UE; 5-/5 LE.  No observable drift.  Reflexes:   1+ b/l biceps, triceps, brachioradialis, patella and Achilles.  Plantar response downgoing b/l.   Sensory examination:   stocking distribution decrement in PP, LT, vibration in lower extremities  Cerebellum:   FTN/HKS intact with normal DION in all limbs.  No dysmetria or dysdiadokinesia.     Labs:   CBC Full  -  ( 13 Apr 2018 07:09 )  WBC Count : 8.03 K/uL  Hemoglobin : 11.2 g/dL  Hematocrit : 36.0 %  Platelet Count - Automated : 343 K/uL  Mean Cell Volume : 83.5 fL  Mean Cell Hemoglobin : 26.0 pg  Mean Cell Hemoglobin Concentration : 31.1 g/dL  Auto Neutrophil # : 5.88 K/uL  Auto Lymphocyte # : 0.98 K/uL  Auto Monocyte # : 0.92 K/uL  Auto Eosinophil # : 0.16 K/uL  Auto Basophil # : 0.05 K/uL  Auto Neutrophil % : 73.2 %  Auto Lymphocyte % : 12.2 %  Auto Monocyte % : 11.5 %  Auto Eosinophil % : 2.0 %  Auto Basophil % : 0.6 %    04-13    134<L>  |  91<L>  |  5<L>  ----------------------------<  79  3.6   |  28  |  0.5<L>    Ca    7.9<L>      13 Apr 2018 07:09  Mg     1.6     04-13        PT/INR - ( 13 Apr 2018 07:09 )   PT: 33.30 sec;   INR: 3.01 ratio         PTT - ( 13 Apr 2018 07:09 )  PTT:36.0 sec        Neuroimaging:  NCHCT:   < from: CT Head No Cont (04.12.18 @ 10:42) >    In comparison with the prior noncontrast CT scan of the brain dated   February 2, 2018:    Stable examination.    No acute intracranial hemorrhage.    < end of copied text >      Assessment:  This is a 89y Female with h/o diabetic neuropathy, temporal arteritis, chronic DVT's who experienced syncope, possibly with brief convulsion (aka convulsive syncope).  Doubt epileptic seizure.  Neuroimaging is unremakable.  Her neuropathy likely has autonomic component.    Plan:   1.  Hydration.  2.  Falls precautions.  3.  EEG.      04-14-18 @ 00:46

## 2018-04-15 LAB
ALBUMIN SERPL ELPH-MCNC: 2.5 G/DL — LOW (ref 3.5–5.2)
ALP SERPL-CCNC: 61 U/L — SIGNIFICANT CHANGE UP (ref 30–115)
ALT FLD-CCNC: 5 U/L — SIGNIFICANT CHANGE UP (ref 0–41)
ANION GAP SERPL CALC-SCNC: 15 MMOL/L — HIGH (ref 7–14)
AST SERPL-CCNC: 18 U/L — SIGNIFICANT CHANGE UP (ref 0–41)
BILIRUB SERPL-MCNC: 0.5 MG/DL — SIGNIFICANT CHANGE UP (ref 0.2–1.2)
BUN SERPL-MCNC: 6 MG/DL — LOW (ref 10–20)
CALCIUM SERPL-MCNC: 7.7 MG/DL — LOW (ref 8.5–10.1)
CHLORIDE SERPL-SCNC: 93 MMOL/L — LOW (ref 98–110)
CO2 SERPL-SCNC: 27 MMOL/L — SIGNIFICANT CHANGE UP (ref 17–32)
CREAT SERPL-MCNC: <0.5 MG/DL — LOW (ref 0.7–1.5)
GLUCOSE SERPL-MCNC: 82 MG/DL — SIGNIFICANT CHANGE UP (ref 70–99)
HCT VFR BLD CALC: 34 % — LOW (ref 37–47)
HGB BLD-MCNC: 10.8 G/DL — LOW (ref 12–16)
MCHC RBC-ENTMCNC: 26.3 PG — LOW (ref 27–31)
MCHC RBC-ENTMCNC: 31.8 G/DL — LOW (ref 32–37)
MCV RBC AUTO: 82.7 FL — SIGNIFICANT CHANGE UP (ref 81–99)
NRBC # BLD: 0 /100 WBCS — SIGNIFICANT CHANGE UP (ref 0–0)
PLATELET # BLD AUTO: 352 K/UL — SIGNIFICANT CHANGE UP (ref 130–400)
POTASSIUM SERPL-MCNC: 3.6 MMOL/L — SIGNIFICANT CHANGE UP (ref 3.5–5)
POTASSIUM SERPL-SCNC: 3.6 MMOL/L — SIGNIFICANT CHANGE UP (ref 3.5–5)
PROT SERPL-MCNC: 5 G/DL — LOW (ref 6–8)
RBC # BLD: 4.11 M/UL — LOW (ref 4.2–5.4)
RBC # FLD: 19.7 % — HIGH (ref 11.5–14.5)
SODIUM SERPL-SCNC: 135 MMOL/L — SIGNIFICANT CHANGE UP (ref 135–146)
WBC # BLD: 5.84 K/UL — SIGNIFICANT CHANGE UP (ref 4.8–10.8)
WBC # FLD AUTO: 5.84 K/UL — SIGNIFICANT CHANGE UP (ref 4.8–10.8)

## 2018-04-15 RX ADMIN — Medication 1 TABLET(S): at 12:21

## 2018-04-15 RX ADMIN — GABAPENTIN 200 MILLIGRAM(S): 400 CAPSULE ORAL at 12:21

## 2018-04-15 RX ADMIN — Medication 1000 MILLIGRAM(S): at 05:54

## 2018-04-15 RX ADMIN — CEFEPIME 100 MILLIGRAM(S): 1 INJECTION, POWDER, FOR SOLUTION INTRAMUSCULAR; INTRAVENOUS at 05:54

## 2018-04-15 RX ADMIN — CEFEPIME 100 MILLIGRAM(S): 1 INJECTION, POWDER, FOR SOLUTION INTRAMUSCULAR; INTRAVENOUS at 17:41

## 2018-04-15 NOTE — CONSULT NOTE ADULT - ASSESSMENT
Patient is a 89y old  Female with multiple co-morbidities including Breast Ca s/p lumpectomy, DM with neuropathy, recurrent UTIs, presented after witnessed syncopal episode and generalized weakness. She has no fever or chills. but dysuria- incontinent at baseline, wears diapers. The ID consult requested to assist with antibiotic  management since Outpatient Urine culture grew Pseudomonas, in the setting of multiple antibiotic allergies.    # UTI- Pseudomonas    Would recommend:    1. Continue Cefepime since its sensitive to Pseudomonas and also patient is tolerating in the settingof multiple co-morbidities    d/w Patient's daughter at the bed side    will follow the patient with you and make further recommendation based on the clinical course and Lab results  Thank you for the opportunity to participate in Ms. BELTRAN's care

## 2018-04-15 NOTE — DIETITIAN INITIAL EVALUATION ADULT. - NS AS NUTRI INTERV VITAMIN3
RD to monitor need for need for vit/mineral supplements - consider MVI without vit K if po intake is poor and pt continues with pressure injuries

## 2018-04-15 NOTE — DIETITIAN INITIAL EVALUATION ADULT. - OTHER INFO
Pt daughter reports pt with wt loss, believes UBW ~158 lbs but that was at least 6 months ago - will monitor wt trends as unsure of timeline. (Reason for assessment: consult for pressure ulcer)

## 2018-04-15 NOTE — DIETITIAN INITIAL EVALUATION ADULT. - NS AS NUTRI INTERV MEALS SNACK
Texture-modified diet/consider adding puree to current diet order - pt daughter reports does not wear dentures as they do not fit well

## 2018-04-15 NOTE — PROGRESS NOTE ADULT - SUBJECTIVE AND OBJECTIVE BOX
SUBJECTIVE:Patient is a 89y old  Female who presents with a chief complaint of . Today is hospital day 3d     PAST MEDICAL & SURGICAL HISTORY  PAST MEDICAL & SURGICAL HISTORY:  MI (myocardial infarction)  TIA (transient ischemic attack)  Hernia  DVT (deep venous thrombosis)  Arthritis  Hypothyroid  DM (diabetes mellitus)  History of appendectomy  H/O cornea transplant  H/O hysterectomy for benign disease  History of lumpectomy of right breast    ALLERGIES:  Bactrim (Unknown)  Cipro (Unknown)  fluoroquinolone antibiotics (Other (Severe))  Levaquin (Other)  Macrobid (Unknown)  methenamine (Unknown)  nitrofurantoin (Unknown)  penicillin (Unknown)  sulfa drugs (Unknown)    MEDICATIONS:  STANDING MEDICATIONS  acetaminophen   Tablet. 1000 milliGRAM(s) Oral two times a day  calcium carbonate 1250 mG + Vitamin D (OsCal 500 + D) 1 Tablet(s) Oral daily  cefepime  IVPB      cefepime  IVPB 1000 milliGRAM(s) IV Intermittent every 12 hours  dextrose 5%. 1000 milliLiter(s) IV Continuous <Continuous>  dextrose 50% Injectable 12.5 Gram(s) IV Push once  dextrose 50% Injectable 25 Gram(s) IV Push once  dextrose 50% Injectable 25 Gram(s) IV Push once  gabapentin 200 milliGRAM(s) Oral daily  insulin glargine Injectable (LANTUS) 10 Unit(s) SubCutaneous at bedtime  insulin lispro (HumaLOG) corrective regimen sliding scale   SubCutaneous three times a day before meals  insulin lispro Injectable (HumaLOG) 3 Unit(s) SubCutaneous three times a day before meals  levothyroxine 25 MICROGram(s) Oral daily    PRN MEDICATIONS  dextrose Gel 1 Dose(s) Oral once PRN  glucagon  Injectable 1 milliGRAM(s) IntraMuscular once PRN    VITALS:   T(F): 97.9  HR: 99  BP: 106/61  RR: 18  SpO2: --    LABS:                        10.7   5.45  )-----------( 342      ( 14 Apr 2018 06:47 )             33.3     04-14    134<L>  |  94<L>  |  5<L>  ----------------------------<  80  3.5   |  28  |  0.5<L>    Ca    7.7<L>      14 Apr 2018 06:47  Mg     2.0     04-14      PT/INR - ( 14 Apr 2018 06:47 )   PT: 29.60 sec;   INR: 2.69 ratio      PHYSICAL EXAM:  GEN:   LUNGS: Clear to auscultation bilaterally   HEART: S1/S2 present. RRR.   ABD: Soft, non-tender, non-distended. Bowel sounds present  EXT: SUBJECTIVE:Patient is a 89y old  Female who presents with a chief complaint of . Today is hospital day 3d. Patient currently being worked u for syncopal episode. EEG pending, f/u. Continue abx for UTI. Patient HCP is daughter.     PAST MEDICAL & SURGICAL HISTORY  PAST MEDICAL & SURGICAL HISTORY:  MI (myocardial infarction)  TIA (transient ischemic attack)  Hernia  DVT (deep venous thrombosis)  Arthritis  Hypothyroid  DM (diabetes mellitus)  History of appendectomy  H/O cornea transplant  H/O hysterectomy for benign disease  History of lumpectomy of right breast    ALLERGIES:  Bactrim (Unknown)  Cipro (Unknown)  fluoroquinolone antibiotics (Other (Severe))  Levaquin (Other)  Macrobid (Unknown)  methenamine (Unknown)  nitrofurantoin (Unknown)  penicillin (Unknown)  sulfa drugs (Unknown)    MEDICATIONS:  STANDING MEDICATIONS  acetaminophen   Tablet. 1000 milliGRAM(s) Oral two times a day  calcium carbonate 1250 mG + Vitamin D (OsCal 500 + D) 1 Tablet(s) Oral daily  cefepime  IVPB      cefepime  IVPB 1000 milliGRAM(s) IV Intermittent every 12 hours  dextrose 5%. 1000 milliLiter(s) IV Continuous <Continuous>  dextrose 50% Injectable 12.5 Gram(s) IV Push once  dextrose 50% Injectable 25 Gram(s) IV Push once  dextrose 50% Injectable 25 Gram(s) IV Push once  gabapentin 200 milliGRAM(s) Oral daily  insulin glargine Injectable (LANTUS) 10 Unit(s) SubCutaneous at bedtime  insulin lispro (HumaLOG) corrective regimen sliding scale   SubCutaneous three times a day before meals  insulin lispro Injectable (HumaLOG) 3 Unit(s) SubCutaneous three times a day before meals  levothyroxine 25 MICROGram(s) Oral daily    PRN MEDICATIONS  dextrose Gel 1 Dose(s) Oral once PRN  glucagon  Injectable 1 milliGRAM(s) IntraMuscular once PRN    VITALS:   T(F): 97.9  HR: 99  BP: 106/61  RR: 18  SpO2: --    LABS:                        10.7   5.45  )-----------( 342      ( 14 Apr 2018 06:47 )             33.3     04-14    134<L>  |  94<L>  |  5<L>  ----------------------------<  80  3.5   |  28  |  0.5<L>    Ca    7.7<L>      14 Apr 2018 06:47  Mg     2.0     04-14      PT/INR - ( 14 Apr 2018 06:47 )   PT: 29.60 sec;   INR: 2.69 ratio

## 2018-04-15 NOTE — DIETITIAN INITIAL EVALUATION ADULT. - ENERGY NEEDS
5696-0829 kcal/day = (3021-7938 kcal/day = MSJ x 1.2-1.5 AF) vs (3500-9564 kcal/day = 30-35 kcal/kg)  (79-94 g/day = 1.25-1.5 g/kg); fluid needs 1 ml: 1 kcal or per LIP

## 2018-04-15 NOTE — DIETITIAN INITIAL EVALUATION ADULT. - ORAL INTAKE PTA
pt daughter reports pt appetite/intake decreased over past few months but still consuming 3 small meals a day/fair

## 2018-04-15 NOTE — DIETITIAN INITIAL EVALUATION ADULT. - ETIOLOGY
taste changes and food preferences in setting of hospital increased metabolic demand 2/2 wound healing

## 2018-04-15 NOTE — PROGRESS NOTE ADULT - ASSESSMENT
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 Hospital course complicated by UTI.       IMPRESSION:  # Syncope  - CT Head: In comparison with the prior noncontrast CT scan of the brain dated  February 2, 2019. Stable examination. No acute intracranial hemorrhage.  - 2d echo EF NL, grade 1 DD   - Neurology following  - EEG, pending    # Urinary tract infection.  - Outside laboratory  result provided which showed Pseudomonas sensitive to Cefepime.  - Patient has been started on Cefepime and Antibiotics regimen  - ID consulted, f/u recommendations    # DVT  - Continue to Monitor INR and dose coumadin accordingly - Home dosage is 2.5 mg PO  - coumadin.     # Hypothyroid  - Continue with synthroid    # DM: Insulin therapy      # Temporal arteritis: prednisone course completed    # Dispo: Home with OP services. As per Physiatry notes, patients family refused SNF. 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 Hospital course complicated by UTI.       IMPRESSION:  # Syncope  - CT Head: In comparison with the prior noncontrast CT scan of the brain dated  February 2, 2019. Stable examination. No acute intracranial hemorrhage.  - 2d echo EF NL, grade 1 DD   - Neurology following  - EEG, pending    # Urinary tract infection.  - Outside laboratory  result provided which showed Pseudomonas sensitive to Cefepime.  - Patient has been started on Cefepime and Antibiotics regimen  - ID consulted, f/u recommendations    # DVT  - Continue to Monitor INR and dose coumadin accordingly - Home dosage is 2.5 mg PO    # Hypothyroid  - Continue with synthroid    # DM: Insulin therapy    # Temporal arteritis: prednisone course completed    # Dispo: Home with OP services. As per Physiatry notes, patients family refused SNF.

## 2018-04-15 NOTE — CONSULT NOTE ADULT - SUBJECTIVE AND OBJECTIVE BOX
Patient is a 89y old  Female who presents with a chief complaint of     INTERVAL HPI/OVERNIGHT EVENTS:  T(C): 36.6 (04-15-18 @ 06:23), Max: 36.6 (04-14-18 @ 20:42)  HR: 99 (04-15-18 @ 06:23) (91 - 99)  BP: 106/61 (04-15-18 @ 06:23) (103/56 - 106/61)  RR: 18 (04-15-18 @ 06:23) (18 - 18)  SpO2: 93% (04-15-18 @ 14:57) (93% - 93%)  Wt(kg): --  I&O's Summary      PAST MEDICAL & SURGICAL HISTORY:  MI (myocardial infarction)  TIA (transient ischemic attack)  Hernia  DVT (deep venous thrombosis)  Arthritis  Hypothyroid  DM (diabetes mellitus)  History of appendectomy  H/O cornea transplant  H/O hysterectomy for benign disease  History of lumpectomy of right breast      SOCIAL HISTORY  Alcohol:  Tobacco:  Illicit substance use:      FAMILY HISTORY:      LABS:                        10.8   5.84  )-----------( 352      ( 15 Apr 2018 06:18 )             34.0     04-15    135  |  93<L>  |  6<L>  ----------------------------<  82  3.6   |  27  |  <0.5<L>    Ca    7.7<L>      15 Apr 2018 06:18  Mg     2.0     04-14    TPro  5.0<L>  /  Alb  2.5<L>  /  TBili  0.5  /  DBili  x   /  AST  18  /  ALT  5   /  AlkPhos  61  04-15    PT/INR - ( 14 Apr 2018 06:47 )   PT: 29.60 sec;   INR: 2.69 ratio             CAPILLARY BLOOD GLUCOSE  99 (15 Apr 2018 08:10)  105 (14 Apr 2018 21:26)  110 (14 Apr 2018 16:38)                MEDICATIONS  (STANDING):  acetaminophen   Tablet. 1000 milliGRAM(s) Oral two times a day  calcium carbonate 1250 mG + Vitamin D (OsCal 500 + D) 1 Tablet(s) Oral daily  cefepime  IVPB      cefepime  IVPB 1000 milliGRAM(s) IV Intermittent every 12 hours  dextrose 5%. 1000 milliLiter(s) (50 mL/Hr) IV Continuous <Continuous>  dextrose 50% Injectable 12.5 Gram(s) IV Push once  dextrose 50% Injectable 25 Gram(s) IV Push once  dextrose 50% Injectable 25 Gram(s) IV Push once  gabapentin 200 milliGRAM(s) Oral daily  insulin glargine Injectable (LANTUS) 10 Unit(s) SubCutaneous at bedtime  insulin lispro (HumaLOG) corrective regimen sliding scale   SubCutaneous three times a day before meals  insulin lispro Injectable (HumaLOG) 3 Unit(s) SubCutaneous three times a day before meals  levothyroxine 25 MICROGram(s) Oral daily    MEDICATIONS  (PRN):  dextrose Gel 1 Dose(s) Oral once PRN Blood Glucose LESS THAN 70 milliGRAM(s)/deciliter  glucagon  Injectable 1 milliGRAM(s) IntraMuscular once PRN Glucose LESS THAN 70 milligrams/deciliter      REVIEW OF SYSTEMS:  CONSTITUTIONAL: No fever, weight loss, or fatigue  EYES: No eye pain, visual disturbances, or discharge  ENMT:  No difficulty hearing, tinnitus, vertigo; No sinus or throat pain  NECK: No pain or stiffness  RESPIRATORY: No cough, wheezing, chills or hemoptysis; No shortness of breath  CARDIOVASCULAR: No chest pain, palpitations, dizziness, or leg swelling  GASTROINTESTINAL: No abdominal or epigastric pain. No nausea, vomiting, or hematemesis; No diarrhea or constipation. No melena or hematochezia.  GENITOURINARY: No dysuria, frequency, hematuria, or incontinence  NEUROLOGICAL: No headaches, memory loss, loss of strength, numbness, or tremors  SKIN: No itching, burning, rashes, or lesions   LYMPH NODES: No enlarged glands  ENDOCRINE: No heat or cold intolerance; No hair loss  MUSCULOSKELETAL: No joint pain or swelling; No muscle, back, or extremity pain  PSYCHIATRIC: No depression, anxiety, mood swings, or difficulty sleeping  HEME/LYMPH: No easy bruising, or bleeding gums  ALLERY AND IMMUNOLOGIC: No hives or eczema    RADIOLOGY & ADDITIONAL TESTS:    Imaging Personally Reviewed:  [ ] YES  [ ] NO    Consultant(s) Notes Reviewed:  [ ] YES  [ ] NO    PHYSICAL EXAM:  GENERAL: NAD, well-groomed, well-developed  HEAD:  Atraumatic, Normocephalic  EYES: EOMI, PERRLA, conjunctiva and sclera clear  ENMT: No tonsillar erythema, exudates, or enlargement; Moist mucous membranes, Good dentition, No lesions  NECK: Supple, No JVD, Normal thyroid  NERVOUS SYSTEM:  Alert & Oriented X3, Good concentration; Motor Strength 5/5 B/L upper and lower extremities; DTRs 2+ intact and symmetric  CHEST/LUNG: Clear to percussion bilaterally; No rales, rhonchi, wheezing, or rubs  HEART: Regular rate and rhythm; No murmurs, rubs, or gallops  ABDOMEN: Soft, Nontender, Nondistended; Bowel sounds present  EXTREMITIES:  2+ Peripheral Pulses, No clubbing, cyanosis, or edema  LYMPH: No lymphadenopathy noted  SKIN: No rashes or lesions    Care Discussed with Consultants/Other Providers [ ] YES  [ ] NO Patient is a 89y old  Female who presents with a chief complaint of         REVIEW OF SYSTEMS: Total of twelve systems have been reviewed with patient and found to be negative unless mentioned in HPI              PAST MEDICAL & SURGICAL HISTORY:  MI (myocardial infarction)  TIA (transient ischemic attack)  Hernia  DVT (deep venous thrombosis)  Arthritis  Hypothyroid  DM (diabetes mellitus)  History of appendectomy  H/O cornea transplant  H/O hysterectomy for benign disease  History of lumpectomy of right breast        SOCIAL HISTORY  Alcohol:  Tobacco:  Illicit substance use:        FAMILY HISTORY: Non contributory to the present illness        ALLERGIES:          INTERVAL HPI/OVERNIGHT EVENTS:  T(C): 36.6 (04-15-18 @ 06:23), Max: 36.6 (04-14-18 @ 20:42)  HR: 99 (04-15-18 @ 06:23) (91 - 99)  BP: 106/61 (04-15-18 @ 06:23) (103/56 - 106/61)  RR: 18 (04-15-18 @ 06:23) (18 - 18)  SpO2: 93% (04-15-18 @ 14:57) (93% - 93%)  Wt(kg): --  I&O's Summary          PHYSICAL EXAM:  GENERAL: N  CVS:  RESP:  GI:  EXT:  CNS:        LABS:                        10.8   5.84  )-----------( 352      ( 15 Apr 2018 06:18 )             34.0     04-15    135  |  93<L>  |  6<L>  ----------------------------<  82  3.6   |  27  |  <0.5<L>    Ca    7.7<L>      15 Apr 2018 06:18  Mg     2.0     04-14    TPro  5.0<L>  /  Alb  2.5<L>  /  TBili  0.5  /  DBili  x   /  AST  18  /  ALT  5   /  AlkPhos  61  04-15    PT/INR - ( 14 Apr 2018 06:47 )   PT: 29.60 sec;   INR: 2.69 ratio             CAPILLARY BLOOD GLUCOSE  99 (15 Apr 2018 08:10)  105 (14 Apr 2018 21:26)  110 (14 Apr 2018 16:38)                MEDICATIONS  (STANDING):  acetaminophen   Tablet. 1000 milliGRAM(s) Oral two times a day  calcium carbonate 1250 mG + Vitamin D (OsCal 500 + D) 1 Tablet(s) Oral daily  cefepime  IVPB      cefepime  IVPB 1000 milliGRAM(s) IV Intermittent every 12 hours  dextrose 5%. 1000 milliLiter(s) (50 mL/Hr) IV Continuous <Continuous>  dextrose 50% Injectable 12.5 Gram(s) IV Push once  dextrose 50% Injectable 25 Gram(s) IV Push once  dextrose 50% Injectable 25 Gram(s) IV Push once  gabapentin 200 milliGRAM(s) Oral daily  insulin glargine Injectable (LANTUS) 10 Unit(s) SubCutaneous at bedtime  insulin lispro (HumaLOG) corrective regimen sliding scale   SubCutaneous three times a day before meals  insulin lispro Injectable (HumaLOG) 3 Unit(s) SubCutaneous three times a day before meals  levothyroxine 25 MICROGram(s) Oral daily    MEDICATIONS  (PRN):  dextrose Gel 1 Dose(s) Oral once PRN Blood Glucose LESS THAN 70 milliGRAM(s)/deciliter  glucagon  Injectable 1 milliGRAM(s) IntraMuscular once PRN Glucose LESS THAN 70 milligrams/deciliter          RADIOLOGY & ADDITIONAL TESTS:    < from: Xray Chest 1 View AP/PA (04.11.18 @ 21:54) >  New small left base opacity      < end of copied text > Patient is a 89y old  Female with multiple co-morbidities including Breast Ca s/p lumpectomy, DM with neuropathy, recurrent UTIs, presented after witnessed syncopal episode and generalized weakness. She has no fever or chills. but dysuria- incontinent at baseline, wears diapers. The ID consult requested to assist with antibiotic  management since Outpatient Urine culture grew Pseudomonas, in the setting of multiple antibiotic allergies.          REVIEW OF SYSTEMS: Total of twelve systems have been reviewed with patient and found to be negative unless mentioned in HPI          PAST MEDICAL & SURGICAL HISTORY:  MI (myocardial infarction)  TIA (transient ischemic attack)  Hernia  DVT (deep venous thrombosis)  Arthritis  Hypothyroid  DM (diabetes mellitus)  History of appendectomy  H/O cornea transplant  H/O hysterectomy for benign disease  History of lumpectomy of right breast        SOCIAL HISTORY  Alcohol: Does not drink  Tobacco: Does not smoke  Illicit substance use: None        FAMILY HISTORY: Non contributory to the present illness        ALLERGIES: PCN, Bactrim, Cipro, Sulfa          INTERVAL HPI/OVERNIGHT EVENTS:  T(C): 36.6 (04-15-18 @ 06:23), Max: 36.6 (04-14-18 @ 20:42)  HR: 99 (04-15-18 @ 06:23) (91 - 99)  BP: 106/61 (04-15-18 @ 06:23) (103/56 - 106/61)  RR: 18 (04-15-18 @ 06:23) (18 - 18)  SpO2: 93% (04-15-18 @ 14:57) (93% - 93%)  Wt(kg): --  I&O's Summary          PHYSICAL EXAM:  GENERAL: Not in distress  CVS: s1 and s2 present  RESP: Air entry B/L  GI: Abdomen soft and nontender  EXT: No pedal edema  CNS: Awake and alert        LABS:                        10.8   5.84  )-----------( 352      ( 15 Apr 2018 06:18 )             34.0         04-15    135  |  93<L>  |  6<L>  ----------------------------<  82  3.6   |  27  |  <0.5<L>    Ca    7.7<L>      15 Apr 2018 06:18  Mg     2.0     04-14    TPro  5.0<L>  /  Alb  2.5<L>  /  TBili  0.5  /  DBili  x   /  AST  18  /  ALT  5   /  AlkPhos  61  04-15    PT/INR - ( 14 Apr 2018 06:47 )   PT: 29.60 sec;   INR: 2.69 ratio             CAPILLARY BLOOD GLUCOSE  99 (15 Apr 2018 08:10)  105 (14 Apr 2018 21:26)  110 (14 Apr 2018 16:38)                MEDICATIONS  (STANDING):  acetaminophen   Tablet. 1000 milliGRAM(s) Oral two times a day  calcium carbonate 1250 mG + Vitamin D (OsCal 500 + D) 1 Tablet(s) Oral daily  cefepime  IVPB      cefepime  IVPB 1000 milliGRAM(s) IV Intermittent every 12 hours  dextrose 5%. 1000 milliLiter(s) (50 mL/Hr) IV Continuous <Continuous>  dextrose 50% Injectable 12.5 Gram(s) IV Push once  dextrose 50% Injectable 25 Gram(s) IV Push once  dextrose 50% Injectable 25 Gram(s) IV Push once  gabapentin 200 milliGRAM(s) Oral daily  insulin glargine Injectable (LANTUS) 10 Unit(s) SubCutaneous at bedtime  insulin lispro (HumaLOG) corrective regimen sliding scale   SubCutaneous three times a day before meals  insulin lispro Injectable (HumaLOG) 3 Unit(s) SubCutaneous three times a day before meals  levothyroxine 25 MICROGram(s) Oral daily    MEDICATIONS  (PRN):  dextrose Gel 1 Dose(s) Oral once PRN Blood Glucose LESS THAN 70 milliGRAM(s)/deciliter  glucagon  Injectable 1 milliGRAM(s) IntraMuscular once PRN Glucose LESS THAN 70 milligrams/deciliter          RADIOLOGY & ADDITIONAL TESTS:    < from: Xray Chest 1 View AP/PA (04.11.18 @ 21:54) >  New small left base opacity      < end of copied text >        MICROBIOLOGY DATA:    4/11/18  urine Cx ( outpatient)- Pansensitive Pseudomonas

## 2018-04-15 NOTE — DIETITIAN INITIAL EVALUATION ADULT. - SIGNS/SYMPTOMS
pt daughter reports po ~25% since admit Stage II pressure injury to sacral spine + Stage II pressure injury to L buttocks

## 2018-04-15 NOTE — DIETITIAN INITIAL EVALUATION ADULT. - PHYSICAL APPEARANCE
BMI: 26.2 (using 61" stated per pt daughter), skin: Stage II to sacral spine, Stage II to L buttocks, BS = 14, (4/14) dependent edema 1+

## 2018-04-15 NOTE — DIETITIAN INITIAL EVALUATION ADULT. - FACTORS AFF FOOD INTAKE
pt with taste changes and decreased appetite (pt daughter reports pt eating ~25% of meals since admit - this is lower than intake has been at home) - daughter reports pt on puree diet here (tolerating consistency well) but does not like some of foods 2/2 taste changes 2/2 meds or lumps found in foods that 'turn her off'

## 2018-04-16 ENCOUNTER — TRANSCRIPTION ENCOUNTER (OUTPATIENT)
Age: 83
End: 2018-04-16

## 2018-04-16 LAB
ANION GAP SERPL CALC-SCNC: 10 MMOL/L — SIGNIFICANT CHANGE UP (ref 7–14)
APTT BLD: 32.9 SEC — SIGNIFICANT CHANGE UP (ref 27–39.2)
BUN SERPL-MCNC: 6 MG/DL — LOW (ref 10–20)
CALCIUM SERPL-MCNC: 7.7 MG/DL — LOW (ref 8.5–10.1)
CHLORIDE SERPL-SCNC: 99 MMOL/L — SIGNIFICANT CHANGE UP (ref 98–110)
CO2 SERPL-SCNC: 28 MMOL/L — SIGNIFICANT CHANGE UP (ref 17–32)
CREAT SERPL-MCNC: <0.5 MG/DL — LOW (ref 0.7–1.5)
GLUCOSE SERPL-MCNC: 83 MG/DL — SIGNIFICANT CHANGE UP (ref 70–99)
HCT VFR BLD CALC: 32.7 % — LOW (ref 37–47)
HGB BLD-MCNC: 10.3 G/DL — LOW (ref 12–16)
INR BLD: 1.76 RATIO — HIGH (ref 0.65–1.3)
MCHC RBC-ENTMCNC: 26.1 PG — LOW (ref 27–31)
MCHC RBC-ENTMCNC: 31.5 G/DL — LOW (ref 32–37)
MCV RBC AUTO: 83 FL — SIGNIFICANT CHANGE UP (ref 81–99)
NRBC # BLD: 0 /100 WBCS — SIGNIFICANT CHANGE UP (ref 0–0)
PLATELET # BLD AUTO: 299 K/UL — SIGNIFICANT CHANGE UP (ref 130–400)
POTASSIUM SERPL-MCNC: 3.4 MMOL/L — LOW (ref 3.5–5)
POTASSIUM SERPL-SCNC: 3.4 MMOL/L — LOW (ref 3.5–5)
PROTHROM AB SERPL-ACNC: 19.2 SEC — HIGH (ref 9.95–12.87)
RBC # BLD: 3.94 M/UL — LOW (ref 4.2–5.4)
RBC # FLD: 19.3 % — HIGH (ref 11.5–14.5)
SODIUM SERPL-SCNC: 137 MMOL/L — SIGNIFICANT CHANGE UP (ref 135–146)
WBC # BLD: 4.8 K/UL — SIGNIFICANT CHANGE UP (ref 4.8–10.8)
WBC # FLD AUTO: 4.8 K/UL — SIGNIFICANT CHANGE UP (ref 4.8–10.8)

## 2018-04-16 RX ORDER — WARFARIN SODIUM 2.5 MG/1
3 TABLET ORAL ONCE
Qty: 0 | Refills: 0 | Status: DISCONTINUED | OUTPATIENT
Start: 2018-04-16 | End: 2018-04-16

## 2018-04-16 RX ORDER — POTASSIUM CHLORIDE 20 MEQ
20 PACKET (EA) ORAL ONCE
Qty: 0 | Refills: 0 | Status: COMPLETED | OUTPATIENT
Start: 2018-04-16 | End: 2018-04-16

## 2018-04-16 RX ORDER — WARFARIN SODIUM 2.5 MG/1
2.5 TABLET ORAL ONCE
Qty: 0 | Refills: 0 | Status: DISCONTINUED | OUTPATIENT
Start: 2018-04-16 | End: 2018-04-16

## 2018-04-16 RX ADMIN — Medication 1 TABLET(S): at 15:01

## 2018-04-16 RX ADMIN — Medication 20 MILLIEQUIVALENT(S): at 12:37

## 2018-04-16 RX ADMIN — Medication 25 MICROGRAM(S): at 06:20

## 2018-04-16 RX ADMIN — CEFEPIME 100 MILLIGRAM(S): 1 INJECTION, POWDER, FOR SOLUTION INTRAMUSCULAR; INTRAVENOUS at 06:19

## 2018-04-16 RX ADMIN — CEFEPIME 100 MILLIGRAM(S): 1 INJECTION, POWDER, FOR SOLUTION INTRAMUSCULAR; INTRAVENOUS at 17:35

## 2018-04-16 RX ADMIN — GABAPENTIN 200 MILLIGRAM(S): 400 CAPSULE ORAL at 11:15

## 2018-04-16 NOTE — DISCHARGE NOTE ADULT - PROVIDER TOKENS
FREE:[LAST:[Edgardo],FIRST:[Shankar ELLIS],PHONE:[phone]],FAX:[(   )    -],ADDRESS:[[address] [place] [address]]]

## 2018-04-16 NOTE — PHYSICAL THERAPY INITIAL EVALUATION ADULT - GENERAL OBSERVATIONS, REHAB EVAL
Pt adamantly refusing b/s PT stating she has already fallen 3 times at home and absolutely will not try with PT at this time as she believes she will fall again. PT gave extensive education regarding role of b/s PT, including safety assessment and fall prevention however pt continued to refuse. Pt would not allow PT to initiate bed mobility despite attempt at tactile cues to mobilize LE.

## 2018-04-16 NOTE — DISCHARGE NOTE ADULT - PLAN OF CARE
Resolution of infection and symptoms Patient has completed course of Intravenous Antibiotics.   Please follow up with primary medical doctor within one week of discharge.

## 2018-04-16 NOTE — PROGRESS NOTE ADULT - ASSESSMENT
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 Hospital course complicated by UTI.     IMPRESSION:  # Syncope  - CT Head: In comparison with the prior noncontrast CT scan of the brain dated  February 2, 2019. Stable examination. No acute intracranial hemorrhage.  - 2d echo EF NL, grade 1 DD   - Neurology following  - EEG Normal    # Urinary tract infection.  - Outside laboratory  result provided which showed Pseudomonas sensitive to Cefepime. Will continue Cefepime for now  - ID consulted, f/u recommendations in terms of which ABX to be continued. Patient with extensive allergy list.     # DVT  - Continue to Monitor INR and dose coumadin accordingly - Home dosage is 2.5 mg PO  - INR Subtherapeutic today. Dosed Coumadin 3mg PO today as opposed to home dosage of 2.5mg PO.     # Hypothyroid  - Continue with synthroid    # DM: Insulin therapy    # Temporal arteritis: prednisone course completed    # Dispo: Home with OP services. As per Physiatry notes, patients family refused SNF.

## 2018-04-16 NOTE — PHYSICAL THERAPY INITIAL EVALUATION ADULT - GENERAL OBSERVATIONS, REHAB EVAL
DTR explained pt's recent difficulty with mobility at home requiring assist from son/daughter for standing pivot transfers and minimal amb. PT explained role of b/s PT and PT goals to improve mobility however DTR insisted that pt rest today.

## 2018-04-16 NOTE — DISCHARGE NOTE ADULT - ADDITIONAL INSTRUCTIONS
Please follow up with primary medical doctor within one week of discharge.  Please take medications as prescribed  If symptoms worsen or reoccur please call 911 or report to the nearest Emergency Medicine department  Please take Coumadin as prescribed. Please monitor INR and keep therapeutic. Please follow up with primary medical doctor within one week of discharge  Please take medications as prescribed  If symptoms worsen or reoccur please call 911 or report to the nearest Emergency Medicine department  Please take Coumadin as prescribed. Please monitor INR and keep therapeutic.

## 2018-04-16 NOTE — CONSULT NOTE ADULT - SUBJECTIVE AND OBJECTIVE BOX
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 As per daughter, patient has been feeling weak for the past few days, unable to get out of bed. Yesterday after 3 days, patient was able to sit in a chair when she felt like her neck was hurting and then as per son, she was mumbling and felt cold and clammy. She then slumped her chin down to her chest and syncopized for less than a minute. After a couple of minutes, she was back to her baseline and able to respond to her family normally. Her daughter said during the episode that she noticed a period of mild brief shaking which went away. She denied any foaming at the mouth, tongue biting and patient is incontinent at baseline.  Denies fevers, CP, SOB, abdominal pain, N/V/D. Admits to dysuria- incontinent at baseline, wears diapers. As per daughter, completed her recent course of abx for UTI.  Patient has been in and out of the hospital several times in the past few months for temporal arteritis, UTIs, URI so family is unsure whether her weakness is related to that or general deconditioning. (12 Apr 2018 15:17)    FAMILY HISTORY:    PAST MEDICAL & SURGICAL HISTORY:  MI (myocardial infarction)  TIA (transient ischemic attack)  Hernia  DVT (deep venous thrombosis)  Arthritis  Hypothyroid  DM (diabetes mellitus)  History of appendectomy  H/O cornea transplant  H/O hysterectomy for benign disease  History of lumpectomy of right breast        VITALS:  T(F): 97.4, Max: 97.9 (04-15-18 @ 21:26)  HR: 91  BP: 107/58  RR: 20Vital Signs Last 24 Hrs  T(C): 36.3 (15 Apr 2018 22:01), Max: 36.6 (15 Apr 2018 21:26)  T(F): 97.4 (15 Apr 2018 22:01), Max: 97.9 (15 Apr 2018 21:26)  HR: 91 (15 Apr 2018 22:01) (91 - 91)  BP: 107/58 (15 Apr 2018 22:01) (107/58 - 115/56)  BP(mean): --  RR: 20 (15 Apr 2018 22:01) (20 - 20)  SpO2: 93% (15 Apr 2018 14:57) (93% - 93%)    TESTS & MEASUREMENTS:                        10.3   4.80  )-----------( 299      ( 16 Apr 2018 04:53 )             32.7     04-16    137  |  99  |  6<L>  ----------------------------<  83  3.4<L>   |  28  |  <0.5<L>    Ca    7.7<L>      16 Apr 2018 04:53    TPro  5.0<L>  /  Alb  2.5<L>  /  TBili  0.5  /  DBili  x   /  AST  18  /  ALT  5   /  AlkPhos  61  04-15    LIVER FUNCTIONS - ( 15 Apr 2018 06:18 )  Alb: 2.5 g/dL / Pro: 5.0 g/dL / ALK PHOS: 61 U/L / ALT: 5 U/L / AST: 18 U/L / GGT: x             RADIOLOGY & ADDITIONAL TESTS:  < from: CT Head No Cont (04.12.18 @ 10:42) >  The third and lateral ventricles are mildly enlarged as are the cortical   sulci consistent with a mild degree of cortical atrophy.  The fourth   ventricle is normal in size and position.    There is no shift of the midline structures, evidence of acute   intracranial hemorrhage, or depressed skull fracture.    Mild wideningof the cerebellopontine angle cisterns and mild prominence   of the cerebellar folia consistent with a mild degree of cerebellar   atrophy.    Patchy foci of diminished attenuation in the periventricular white   matter. These findings are nonspecificin appearance and differential   diagnostic possibilities include ischemic change, foci of gliosis or   demyelination.    Stable examination    No acute intracranial hemorrhage    < end of copied text >      < from: Xray Chest 1 View AP/PA (04.11.18 @ 21:54) >  Lung parenchyma/Pleura: Stable linear atelectasis at the right base and   new small left base opacity      Urine Microscopic-Add On (NC) (04.11.18 @ 19:56)    Red Blood Cell - Urine: 5-10 /HPF    White Blood Cell - Urine: >50 /HPF    Bacteria: Many /HPF    Epithelial Cells: Few /HPF      Urinalysis (04.11.18 @ 19:56)    Glucose Qualitative, Urine: Negative mg/dL    Blood, Urine: Small    pH Urine: 6.5    Color: Yellow    Urine Appearance: Turbid    Bilirubin: Negative    Ketone - Urine: Trace    Specific Gravity: 1.010    Protein, Urine: 100 mg/dL    Urobilinogen: 0.2 mg/dL    Nitrite: Positive    Leukocyte Esterase Concentration: Large        ANTIBIOTICS:  cefepime  IVPB      cefepime  IVPB 1000 milliGRAM(s) IV Intermittent every 12 hours RACIEL MILLIGAN  89y, Female  Allergy: Bactrim (Unknown)  Cipro (Unknown)  fluoroquinolone antibiotics (Other (Severe))  Levaquin (Other)  Macrobid (Unknown)  methenamine (Unknown)  nitrofurantoin (Unknown)  penicillin (Unknown)  sulfa drugs (Unknown)      HPI:  89 y/o female with PMHx of Breast Ca s/p lumpectomy, DM with neuropathy, Hypothyroidism, MI 2013, TIA 2016, 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 As per daughter, patient has been feeling weak for the past few days, unable to get out of bed. Yesterday after 3 days, patient was able to sit in a chair when she felt like her neck was hurting and then as per son, she was mumbling and felt cold and clammy. She then slumped her chin down to her chest and syncopized for less than a minute. After a couple of minutes, she was back to her baseline and able to respond to her family normally. Her daughter said during the episode that she noticed a period of mild brief shaking which went away. She denied any foaming at the mouth, tongue biting and patient is incontinent at baseline.  Denies fevers, CP, SOB, abdominal pain, N/V/D. Admits to dysuria- incontinent at baseline, wears diapers. As per daughter, completed her recent course of abx for UTI.  Patient has been in and out of the hospital several times in the past few months for temporal arteritis, UTIs, URI so family is unsure whether her weakness is related to that or general deconditioning. (12 Apr 2018 15:17)          TODAY    - Pt seen and examined with daughter at bedside. Still complaining of dysuria. No fevers, chills,nausea or vomiting. Per daughter pt has had recurrent UTI's last one was treated with PO Vantin x 10 days prior to this admission with no relief of symptoms. She states the she has had bladder prolapse which was treated to prevent urinary retention and infections but now has urinary incontinence. Denies cough, SOB, c/p, dizziness, lightheadedness.    In the past pt was treated levaquin and became altered for 6months even after stopping medications. When she had taken Nitrofurantoin she had severe skin reaction requiring BURN unit admission for sloughing of skin. Pt does not recall what occurs when she takes penicillin but reports no events since being Cefepime.         FAMILY HISTORY:    PAST MEDICAL & SURGICAL HISTORY:  MI (myocardial infarction)  TIA (transient ischemic attack)  Hernia  DVT (deep venous thrombosis)  Arthritis  Hypothyroid  DM (diabetes mellitus)  History of appendectomy  H/O cornea transplant  H/O hysterectomy for benign disease  History of lumpectomy of right breast        VITALS:  T(F): 97.4, Max: 97.9 (04-15-18 @ 21:26)  HR: 91  BP: 107/58  RR: 20Vital Signs Last 24 Hrs  T(C): 36.3 (15 Apr 2018 22:01), Max: 36.6 (15 Apr 2018 21:26)  T(F): 97.4 (15 Apr 2018 22:01), Max: 97.9 (15 Apr 2018 21:26)  HR: 91 (15 Apr 2018 22:01) (91 - 91)  BP: 107/58 (15 Apr 2018 22:01) (107/58 - 115/56)  BP(mean): --  RR: 20 (15 Apr 2018 22:01) (20 - 20)  SpO2: 93% (15 Apr 2018 14:57) (93% - 93%)    TESTS & MEASUREMENTS:                        10.3   4.80  )-----------( 299      ( 16 Apr 2018 04:53 )             32.7     04-16    137  |  99  |  6<L>  ----------------------------<  83  3.4<L>   |  28  |  <0.5<L>    Ca    7.7<L>      16 Apr 2018 04:53    TPro  5.0<L>  /  Alb  2.5<L>  /  TBili  0.5  /  DBili  x   /  AST  18  /  ALT  5   /  AlkPhos  61  04-15    LIVER FUNCTIONS - ( 15 Apr 2018 06:18 )  Alb: 2.5 g/dL / Pro: 5.0 g/dL / ALK PHOS: 61 U/L / ALT: 5 U/L / AST: 18 U/L / GGT: x             RADIOLOGY & ADDITIONAL TESTS:  < from: CT Head No Cont (04.12.18 @ 10:42) >  The third and lateral ventricles are mildly enlarged as are the cortical   sulci consistent with a mild degree of cortical atrophy.  The fourth   ventricle is normal in size and position.    There is no shift of the midline structures, evidence of acute   intracranial hemorrhage, or depressed skull fracture.    Mild wideningof the cerebellopontine angle cisterns and mild prominence   of the cerebellar folia consistent with a mild degree of cerebellar   atrophy.    Patchy foci of diminished attenuation in the periventricular white   matter. These findings are nonspecificin appearance and differential   diagnostic possibilities include ischemic change, foci of gliosis or   demyelination.    Stable examination    No acute intracranial hemorrhage    < end of copied text >      < from: Xray Chest 1 View AP/PA (04.11.18 @ 21:54) >  Lung parenchyma/Pleura: Stable linear atelectasis at the right base and   new small left base opacity      Urine Microscopic-Add On (NC) (04.11.18 @ 19:56)    Red Blood Cell - Urine: 5-10 /HPF    White Blood Cell - Urine: >50 /HPF    Bacteria: Many /HPF    Epithelial Cells: Few /HPF      Urinalysis (04.11.18 @ 19:56)    Glucose Qualitative, Urine: Negative mg/dL    Blood, Urine: Small    pH Urine: 6.5    Color: Yellow    Urine Appearance: Turbid    Bilirubin: Negative    Ketone - Urine: Trace    Specific Gravity: 1.010    Protein, Urine: 100 mg/dL    Urobilinogen: 0.2 mg/dL    Nitrite: Positive    Leukocyte Esterase Concentration: Large        ANTIBIOTICS:  cefepime  IVPB      cefepime  IVPB 1000 milliGRAM(s) IV Intermittent every 12 hours        PHYSICAL EXAM:      Constitutional: No acute distress; mild-moderate distress with urination    HEENT: NC/AT, no scleral icterus or conjuctival injection    Back: No CVA tenderness    Cardiovascular: +S1S3 RRR    Gastrointestinal: soft, Nondistened, TTP to suprapubic area    Extremities: TTP to bilateral lower extremities, no lower extremity    Neurological: AAOx3, no focal deficits

## 2018-04-16 NOTE — CONSULT NOTE ADULT - ATTENDING COMMENTS
I have personally examined the patient and reviewed the documentation above.  Corrections and edits were made wherever needed.

## 2018-04-16 NOTE — CONSULT NOTE ADULT - ASSESSMENT
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 Hospital course complicated by UTI.     IMPRESSION:    # Urinary tract infection  - U/A 4/11 positive, + nitrite  - No fevers since admission  - WBC normal  - Outside laboratory  result provided which showed Pseudomonas sensitive to Cefepime. Will continue Cefepime for now  - Allergic to Cipro, Penicillin (but tolerating cephalosporin), Macrobid.     #Abnormal CXR 89 yo F with PMH of Breast Ca s/p lumpectomy, DM with neuropathy, Hypothyroidism, MI 2013, TIA 2016, Chronic DVTs ~12 years ago on Coumadin, Diverticulosis, Osteoarthritis, Temporal Arteritis  presented after witnessed syncopal episode with shaking , no LOC or head trauma. Hospital course complicated by UTI.     IMPRESSION:    # Urinary tract infection  - U/A 4/11 positive, + nitrite  - No fevers since admission  - WBC normal  - [facility] result provided which showed Pseudomonas sensitive to Cefepime. Stable on Cefepime.  - AMS with administration of Cipro in the past, Penicillin allergy response unknown (but tolerating cephalosporin), Macrobid causes skin reaction.     #Abnormal CXR  - No clinical signs of PNA, no fever, no WBC, no cough 89 yo F with PMH of Breast Ca s/p lumpectomy, DM with neuropathy, Hypothyroidism, MI 2013, TIA 2016, Chronic DVTs ~12 years ago on Coumadin, Diverticulosis, Osteoarthritis, Temporal Arteritis  presented after witnessed syncopal episode with shaking , no LOC or head trauma. Hospital course complicated by UTI.     IMPRESSION:    # Urinary tract infection  - U/A 4/11 positive, + nitrite  - No fevers since admission  - WBC normal  - [facility] laboratory result provided which showed Pseudomonas sensitive to Cefepime. Stable on Cefepime.  - AMS with administration of Cipro in the past, Penicillin allergy response unknown (but tolerating cephalosporin), Macrobid causes skin reaction.   duration of cefepime is 7 days in all.    recall prn please.

## 2018-04-16 NOTE — DISCHARGE NOTE ADULT - HOSPITAL COURSE
89 yo F with a past medical history of Breast Ca s/p lumpectomy, DM with neuropathy, Hypothyroidism, MI 2013, TIA 2016, Chronic DVTs ~12 years ago on Coumadin, Diverticulosis, Osteoarthritis, Temporal Arteritis  presented after witnessed syncopal episode with shaking. There was no LOC or head trauma. Hospital course complicated by UTI.     Patient was treating for the following conditions:    1.  Syncope  - CT Head: In comparison with the prior noncontrast CT scan of the brain dated  February 2, 2019. Stable examination. No acute intracranial hemorrhage.  - 2d echo EF NL, grade 1 DD   - Neurology was consulted  - EEG Normal    2. Urinary tract infection with MDR Pesudomonas  - Outside laboratory result provided which showed Pseudomonas sensitive to Cefepime.  - ID recommendations noted and appreciated: Patient was treated with Cefepime for a total of 7 day duration. (Start Date: 4/14/18, End Date: 4/20/18)    3. DVT: Continue to Monitor INR and dose coumadin accordingly - Home dosage is 2.5 mg PO    4. Hypothyroid: Continue with synthroid    5. DM: Insulin therapy while in hospital    6. Temporal arteritis: prednisone course completed    7. Dispo: Home with OP services. As per Physiatry notes, patients family refused SNF.

## 2018-04-16 NOTE — DISCHARGE NOTE ADULT - MEDICATION SUMMARY - MEDICATIONS TO STOP TAKING
I will STOP taking the medications listed below when I get home from the hospital:    predniSONE 2.5 mg oral tablet  -- 1 tab(s) by mouth once a day    gabapentin 100 mg oral capsule  -- 1 cap(s) by mouth once a day    Lantus 100 units/mL subcutaneous solution  -- 10 unit(s) subcutaneous once a day (at bedtime)    conjugated estrogens 0.625 mg/g vaginal cream with applicator  -- 1 applicatorful vaginally once a day (at bedtime)    clotrimazole 2% vaginal cream with applicator  -- 1 applicatorful vaginally 2 times a day    ocular lubricant ophthalmic solution  -- 2 drop(s) to each affected eye 2 times a day    cefpodoxime 200 mg oral tablet  -- 1 tab(s) by mouth every 12 hours    chlorproPAMIDE  -- 50 milligram(s) by mouth once a day    Premarin 0.625 mg/g vaginal cream with applicator  -- 1 gram(s) vaginally once a day (at bedtime)

## 2018-04-16 NOTE — DISCHARGE NOTE ADULT - MEDICATION SUMMARY - MEDICATIONS TO TAKE
I will START or STAY ON the medications listed below when I get home from the hospital:    Tylenol 500 mg oral tablet  -- 2 tab(s) by mouth 2 times a day  -- Indication: For Pain    Coumadin 2.5 mg oral tablet  -- 1 tab(s) by mouth once a day   -- Do not take this drug if you are pregnant.  It is very important that you take or use this exactly as directed.  Do not skip doses or discontinue unless directed by your doctor.  Obtain medical advice before taking any non-prescription drugs as some may affect the action of this medication.    -- Indication: For Anticoagulation    gabapentin 100 mg oral capsule  -- 2 cap(s) by mouth 3 times a day  -- Indication: For Neuropathic Pain    levothyroxine 25 mcg (0.025 mg) oral tablet  -- 1 tab(s) by mouth once a day  -- Indication: For Hypothroidism    calcium-vitamin D 500 mg-200 intl units oral tablet  -- 1 tab(s) by mouth once a day  -- Indication: For Supplementation

## 2018-04-16 NOTE — DISCHARGE NOTE ADULT - CARE PLAN
Principal Discharge DX:	Urinary tract infection without hematuria, site unspecified  Goal:	Resolution of infection and symptoms  Assessment and plan of treatment:	Patient has completed course of Intravenous Antibiotics.   Please follow up with primary medical doctor within one week of discharge.

## 2018-04-17 LAB
ANION GAP SERPL CALC-SCNC: 10 MMOL/L — SIGNIFICANT CHANGE UP (ref 7–14)
APTT BLD: 30.5 SEC — SIGNIFICANT CHANGE UP (ref 27–39.2)
BUN SERPL-MCNC: 5 MG/DL — LOW (ref 10–20)
CALCIUM SERPL-MCNC: 8 MG/DL — LOW (ref 8.5–10.1)
CHLORIDE SERPL-SCNC: 96 MMOL/L — LOW (ref 98–110)
CO2 SERPL-SCNC: 29 MMOL/L — SIGNIFICANT CHANGE UP (ref 17–32)
CREAT SERPL-MCNC: <0.5 MG/DL — LOW (ref 0.7–1.5)
GLUCOSE SERPL-MCNC: 87 MG/DL — SIGNIFICANT CHANGE UP (ref 70–99)
HCT VFR BLD CALC: 34 % — LOW (ref 37–47)
HGB BLD-MCNC: 10.9 G/DL — LOW (ref 12–16)
INR BLD: 1.49 RATIO — HIGH (ref 0.65–1.3)
MAGNESIUM SERPL-MCNC: 1.7 MG/DL — LOW (ref 1.8–2.4)
MCHC RBC-ENTMCNC: 26.8 PG — LOW (ref 27–31)
MCHC RBC-ENTMCNC: 32.1 G/DL — SIGNIFICANT CHANGE UP (ref 32–37)
MCV RBC AUTO: 83.7 FL — SIGNIFICANT CHANGE UP (ref 81–99)
NRBC # BLD: 0 /100 WBCS — SIGNIFICANT CHANGE UP (ref 0–0)
PLATELET # BLD AUTO: 347 K/UL — SIGNIFICANT CHANGE UP (ref 130–400)
POTASSIUM SERPL-MCNC: 3.9 MMOL/L — SIGNIFICANT CHANGE UP (ref 3.5–5)
POTASSIUM SERPL-SCNC: 3.9 MMOL/L — SIGNIFICANT CHANGE UP (ref 3.5–5)
PROTHROM AB SERPL-ACNC: 16.2 SEC — HIGH (ref 9.95–12.87)
RBC # BLD: 4.06 M/UL — LOW (ref 4.2–5.4)
RBC # FLD: 19.7 % — HIGH (ref 11.5–14.5)
SODIUM SERPL-SCNC: 135 MMOL/L — SIGNIFICANT CHANGE UP (ref 135–146)
WBC # BLD: 6.23 K/UL — SIGNIFICANT CHANGE UP (ref 4.8–10.8)
WBC # FLD AUTO: 6.23 K/UL — SIGNIFICANT CHANGE UP (ref 4.8–10.8)

## 2018-04-17 RX ORDER — MAGNESIUM SULFATE 500 MG/ML
2 VIAL (ML) INJECTION ONCE
Qty: 0 | Refills: 0 | Status: COMPLETED | OUTPATIENT
Start: 2018-04-17 | End: 2018-04-17

## 2018-04-17 RX ORDER — DOCUSATE SODIUM 100 MG
100 CAPSULE ORAL DAILY
Qty: 0 | Refills: 0 | Status: DISCONTINUED | OUTPATIENT
Start: 2018-04-17 | End: 2018-04-20

## 2018-04-17 RX ORDER — GABAPENTIN 400 MG/1
200 CAPSULE ORAL
Qty: 0 | Refills: 0 | Status: DISCONTINUED | OUTPATIENT
Start: 2018-04-17 | End: 2018-04-20

## 2018-04-17 RX ORDER — WARFARIN SODIUM 2.5 MG/1
4 TABLET ORAL ONCE
Qty: 0 | Refills: 0 | Status: COMPLETED | OUTPATIENT
Start: 2018-04-17 | End: 2018-04-17

## 2018-04-17 RX ORDER — SENNA PLUS 8.6 MG/1
2 TABLET ORAL AT BEDTIME
Qty: 0 | Refills: 0 | Status: DISCONTINUED | OUTPATIENT
Start: 2018-04-17 | End: 2018-04-20

## 2018-04-17 RX ADMIN — CEFEPIME 100 MILLIGRAM(S): 1 INJECTION, POWDER, FOR SOLUTION INTRAMUSCULAR; INTRAVENOUS at 05:33

## 2018-04-17 RX ADMIN — Medication 50 GRAM(S): at 15:02

## 2018-04-17 RX ADMIN — Medication 25 MICROGRAM(S): at 05:34

## 2018-04-17 RX ADMIN — WARFARIN SODIUM 4 MILLIGRAM(S): 2.5 TABLET ORAL at 21:35

## 2018-04-17 RX ADMIN — GABAPENTIN 200 MILLIGRAM(S): 400 CAPSULE ORAL at 17:09

## 2018-04-17 RX ADMIN — CEFEPIME 100 MILLIGRAM(S): 1 INJECTION, POWDER, FOR SOLUTION INTRAMUSCULAR; INTRAVENOUS at 17:07

## 2018-04-17 NOTE — PROGRESS NOTE ADULT - ATTENDING COMMENTS
Patient seen and examined independently. Agree with resident note with exceptions. Case discussed with housestaff, nursing and patient    Syncope - EEG pending    UTI - On Cefepime f/u ID
Patient seen and examined independently. Agree with resident note with exceptions. Case discussed with housestaff, nursing and patient    UTI from MDR pseudomonas - Spoke with ID. Cefepime x7 days    D/C planning to home after abx complete
Patient seen and examined independently. Agree with resident note with exceptions. Case discussed with housestaff, nursing and patient    Psudomonal UTI - cefepime for now - f/u ID for duration. May need PICC

## 2018-04-17 NOTE — PROGRESS NOTE ADULT - SUBJECTIVE AND OBJECTIVE BOX
SUBJECTIVE: Patient is a 89y old  Female who presents with a chief complaint of . Today is hospital day 5d. Patient seen and examined. NAD. No acute events overnight. Spoke with daughter at bedside Gabapentin increased to bid for LE neuropathic pain. Will follow up with ID, If we are to continue Cefepime what will be the treatment length?    PAST MEDICAL & SURGICAL HISTORY  PAST MEDICAL & SURGICAL HISTORY:  MI (myocardial infarction)  TIA (transient ischemic attack)  Hernia  DVT (deep venous thrombosis)  Arthritis  Hypothyroid  DM (diabetes mellitus)  History of appendectomy  H/O cornea transplant  H/O hysterectomy for benign disease  History of lumpectomy of right breast    ALLERGIES:  Bactrim (Unknown)  Cipro (Unknown)  fluoroquinolone antibiotics (Other (Severe))  Levaquin (Other)  Macrobid (Unknown)  methenamine (Unknown)  nitrofurantoin (Unknown)  penicillin (Unknown)  sulfa drugs (Unknown)    MEDICATIONS:  STANDING MEDICATIONS  acetaminophen   Tablet. 1000 milliGRAM(s) Oral two times a day  calcium carbonate 1250 mG + Vitamin D (OsCal 500 + D) 1 Tablet(s) Oral daily  cefepime  IVPB      cefepime  IVPB 1000 milliGRAM(s) IV Intermittent every 12 hours  dextrose 5%. 1000 milliLiter(s) IV Continuous <Continuous>  dextrose 50% Injectable 12.5 Gram(s) IV Push once  dextrose 50% Injectable 25 Gram(s) IV Push once  dextrose 50% Injectable 25 Gram(s) IV Push once  docusate sodium 100 milliGRAM(s) Oral daily  gabapentin 200 milliGRAM(s) Oral two times a day  insulin glargine Injectable (LANTUS) 10 Unit(s) SubCutaneous at bedtime  insulin lispro (HumaLOG) corrective regimen sliding scale   SubCutaneous three times a day before meals  insulin lispro Injectable (HumaLOG) 3 Unit(s) SubCutaneous three times a day before meals  levothyroxine 25 MICROGram(s) Oral daily  senna 2 Tablet(s) Oral at bedtime    PRN MEDICATIONS  dextrose Gel 1 Dose(s) Oral once PRN  glucagon  Injectable 1 milliGRAM(s) IntraMuscular once PRN    VITALS:   T(F): 98.1  HR: 96  BP: 109/56  RR: 18  SpO2: --    LABS:                        10.9   6.23  )-----------( 347      ( 17 Apr 2018 09:03 )             34.0     04-17    135  |  96<L>  |  5<L>  ----------------------------<  87  3.9   |  29  |  <0.5<L>    Ca    8.0<L>      17 Apr 2018 09:03  Mg     1.7     04-17  PT/INR - ( 17 Apr 2018 09:03 )   PT: 16.20 sec;   INR: 1.49 ratio    PTT - ( 17 Apr 2018 09:03 )  PTT:30.5 sec    PHYSICAL EXAM:  GEN: NAD  LUNGS: Clear to auscultation bilaterally   HEART: S1/S2 present.  irregular rhythm  ABD: Soft, non-tender, non-distended. Bowel sounds present  EXT: no b/l LE edema

## 2018-04-17 NOTE — PROGRESS NOTE ADULT - ASSESSMENT
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 Hospital course complicated by UTI.     IMPRESSION:    # Syncope  - CT Head: In comparison with the prior noncontrast CT scan of the brain dated  February 2, 2019. Stable examination. No acute intracranial hemorrhage.  - 2d echo EF NL, grade 1 DD   - Neurology following  - EEG Normal    # Urinary tract infection.  - Outside laboratory result provided which showed Pseudomonas sensitive to Cefepime. Will continue Cefepime for now  - ID consulted, f/u recommendations in terms of which ABX to be continued. Patient with extensive allergy list. Possible PICC Line?    # DVT  - Continue to Monitor INR and dose coumadin accordingly - Home dosage is 2.5 mg PO  - INR Subtherapeutic again today. Dosed Coumadin 3mg PO yesterday as opposed to home dosage of 2.5mg PO. Martínez will give patient 4 mg of Coumadin.    # Hypothyroid  - Continue with synthroid    # DM: Insulin therapy    # Temporal arteritis: prednisone course completed    # Dispo: Home with OP services. As per Physiatry notes, patients family refused SNF.

## 2018-04-18 LAB
ANION GAP SERPL CALC-SCNC: 11 MMOL/L — SIGNIFICANT CHANGE UP (ref 7–14)
ANION GAP SERPL CALC-SCNC: 8 MMOL/L — SIGNIFICANT CHANGE UP (ref 7–14)
APTT BLD: 32.2 SEC — SIGNIFICANT CHANGE UP (ref 27–39.2)
APTT BLD: 33.2 SEC — SIGNIFICANT CHANGE UP (ref 27–39.2)
BUN SERPL-MCNC: 6 MG/DL — LOW (ref 10–20)
BUN SERPL-MCNC: 8 MG/DL — LOW (ref 10–20)
CALCIUM SERPL-MCNC: 7.4 MG/DL — LOW (ref 8.5–10.1)
CALCIUM SERPL-MCNC: 7.6 MG/DL — LOW (ref 8.5–10.1)
CHLORIDE SERPL-SCNC: 96 MMOL/L — LOW (ref 98–110)
CHLORIDE SERPL-SCNC: 96 MMOL/L — LOW (ref 98–110)
CO2 SERPL-SCNC: 28 MMOL/L — SIGNIFICANT CHANGE UP (ref 17–32)
CO2 SERPL-SCNC: 29 MMOL/L — SIGNIFICANT CHANGE UP (ref 17–32)
CREAT SERPL-MCNC: <0.5 MG/DL — LOW (ref 0.7–1.5)
CREAT SERPL-MCNC: <0.5 MG/DL — LOW (ref 0.7–1.5)
GLUCOSE SERPL-MCNC: 106 MG/DL — HIGH (ref 70–99)
GLUCOSE SERPL-MCNC: 112 MG/DL — HIGH (ref 70–99)
HCT VFR BLD CALC: 30.4 % — LOW (ref 37–47)
HGB BLD-MCNC: 9.6 G/DL — LOW (ref 12–16)
INR BLD: 2.01 RATIO — HIGH (ref 0.65–1.3)
INR BLD: 2.07 RATIO — HIGH (ref 0.65–1.3)
MAGNESIUM SERPL-MCNC: 2 MG/DL — SIGNIFICANT CHANGE UP (ref 1.8–2.4)
MCHC RBC-ENTMCNC: 26.2 PG — LOW (ref 27–31)
MCHC RBC-ENTMCNC: 31.6 G/DL — LOW (ref 32–37)
MCV RBC AUTO: 82.8 FL — SIGNIFICANT CHANGE UP (ref 81–99)
NRBC # BLD: 0 /100 WBCS — SIGNIFICANT CHANGE UP (ref 0–0)
PLATELET # BLD AUTO: 290 K/UL — SIGNIFICANT CHANGE UP (ref 130–400)
POTASSIUM SERPL-MCNC: 3.5 MMOL/L — SIGNIFICANT CHANGE UP (ref 3.5–5)
POTASSIUM SERPL-MCNC: 3.7 MMOL/L — SIGNIFICANT CHANGE UP (ref 3.5–5)
POTASSIUM SERPL-SCNC: 3.5 MMOL/L — SIGNIFICANT CHANGE UP (ref 3.5–5)
POTASSIUM SERPL-SCNC: 3.7 MMOL/L — SIGNIFICANT CHANGE UP (ref 3.5–5)
PROTHROM AB SERPL-ACNC: 22 SEC — HIGH (ref 9.95–12.87)
PROTHROM AB SERPL-ACNC: 22.7 SEC — HIGH (ref 9.95–12.87)
RBC # BLD: 3.67 M/UL — LOW (ref 4.2–5.4)
RBC # FLD: 19.5 % — HIGH (ref 11.5–14.5)
SODIUM SERPL-SCNC: 132 MMOL/L — LOW (ref 135–146)
SODIUM SERPL-SCNC: 136 MMOL/L — SIGNIFICANT CHANGE UP (ref 135–146)
WBC # BLD: 5.59 K/UL — SIGNIFICANT CHANGE UP (ref 4.8–10.8)
WBC # FLD AUTO: 5.59 K/UL — SIGNIFICANT CHANGE UP (ref 4.8–10.8)

## 2018-04-18 RX ADMIN — CEFEPIME 100 MILLIGRAM(S): 1 INJECTION, POWDER, FOR SOLUTION INTRAMUSCULAR; INTRAVENOUS at 19:05

## 2018-04-18 RX ADMIN — GABAPENTIN 200 MILLIGRAM(S): 400 CAPSULE ORAL at 06:43

## 2018-04-18 RX ADMIN — GABAPENTIN 200 MILLIGRAM(S): 400 CAPSULE ORAL at 19:04

## 2018-04-18 RX ADMIN — Medication 1 TABLET(S): at 13:04

## 2018-04-18 RX ADMIN — CEFEPIME 100 MILLIGRAM(S): 1 INJECTION, POWDER, FOR SOLUTION INTRAMUSCULAR; INTRAVENOUS at 06:42

## 2018-04-18 RX ADMIN — Medication 25 MICROGRAM(S): at 06:42

## 2018-04-18 NOTE — CHART NOTE - NSCHARTNOTEFT_GEN_A_CORE
Registered Dietitian Follow-Up     Patient Profile Reviewed                           Yes [x]   No []     Nutrition History Previously Obtained        Yes [x]  No []       Pertinent Subjective Information:  -Spoke w. pt daughter at bedside as pt disoriented. Reports pt. refused breakfast today but drank Glucerna. Usual PO intake ~25%. Noted pt had unpleasant procedure yesterday and poor PO intake likely 2/2 experience. Reports pt w. persistent decreased taste perception/alteration. If food has lumps present pt will refuse. Daughter notes multiple food preferences; only waffle or Sami toast with banana at breakfast, no green veggies w. lunch or dinner, soup on each lunch/dinner tray and ice cream for dessert. All preferences discussed w. CA to optimize PO intake. Reports pt typically drinks at least 1, sometimes both glucerna. Noted Beneprotein only coming 1x/day but ordered TID.      Pertinent Medical Interventions:     Diet order: Dysphagia 1 Pureed, thin liquids + Glucerna BID and Beneprotein TID     Anthropometrics:  - Ht.  - Wt.: 62.9kg- no new wt  - %wt change  - BMI  - IBW     Pertinent Lab Data: (4/17/18)  -H/H 10.9/34  -Cl 96  -BUN 5  -Mg 1.7     Pertinent Meds: abx, colace, gabapentin, senna, humalog, lantus, OsCal, synthroid      Physical Findings:  - Appearance: alert, disoriented, appears nonverbal   - GI function: +LBM 4/17   - Tubes:  - Oral/Mouth cavity: edentulous, daughter reports dentures are ill fitting and has not gotten resized. Requires pureed fo ease of chewing not decreased swallowing ability.   - Skin: stage II pressure ulcer sacral spine and L buttocks      Nutrition Requirements  Weight Used: 62.9kg      Estimated Energy Needs    Continue [x] (from RD note on 4/15)  Adjusted Energy Recommendations: 2599-5446 kcal/day        Estimated Protein Needs    Continue [x]  (from RD note on 4/15)  Adjusted Protein Recommendations:  79-94 gm/day        Estimated Fluid Needs        Continue [x]  (from RD note on 4/15)  Adjusted Fluid Recommendations:  1 mL : 1 kcal     Nutrient Intake:  -not meeting estimated kcal/protein needs      [x] Previous Nutrition Diagnosis: Inadequate oral intake: ongoing             [x] Ongoing          [] Resolved    [x] Previous Nutrition Diagnosis: Increased nutrient needs: ongoing             [x] Ongoing          [] Resolved     Nutrition Intervention      Goal/Expected Outcome:     Indicator/Monitoring: Registered Dietitian Follow-Up     Patient Profile Reviewed                           Yes [x]   No []     Nutrition History Previously Obtained        Yes [x]  No []       Pertinent Subjective Information:  -Spoke w. pt daughter at bedside as pt disoriented. Reports pt. refused breakfast today but drank Glucerna. Usual PO intake ~15-25%. Noted pt had unpleasant procedure yesterday and poor PO intake likely 2/2 experience. Reports pt w. persistent decreased taste perception/alteration. If food has lumps present pt will refuse. Daughter notes multiple food preferences; only waffle or [place] toast with banana at breakfast, no green veggies w. lunch or dinner, soup on each lunch/dinner tray and ice cream for dessert. All preferences discussed w. CA to optimize PO intake. Reports pt typically drinks at least 1, sometimes both glucerna. Noted Beneprotein only coming 1x/day but ordered TID.      Pertinent Medical Interventions:  Syncope  - CT Head: stable, No acute intracranial hemorrhage.  - EEG Normal    Dispo: Home with OP services. As per Physiatry notes, patients family refused SNF.     Diet order: Dysphagia 1 Pureed, thin liquids + Glucerna BID and Beneprotein TID     Anthropometrics:  - Ht.  - Wt.: 62.9kg- no new wt  - %wt change  - BMI  - IBW     Pertinent Lab Data: (4/17/18)  -H/H 10.9/34  -Cl 96  -BUN 5  -Mg 1.7     Pertinent Meds: abx, colace, gabapentin, senna, humalog, lantus, OsCal, synthroid      Physical Findings:  - Appearance: alert, disoriented, appears nonverbal   - GI function: +LBM 4/17   - Tubes:  - Oral/Mouth cavity: edentulous, daughter reports dentures are ill fitting and has not gotten resized. Requires pureed fo ease of chewing not decreased swallowing ability.   - Skin: stage II pressure ulcer sacral spine and L buttocks      Nutrition Requirements  Weight Used: 62.9kg      Estimated Energy Needs    Continue [x] (from RD note on 4/15)  Adjusted Energy Recommendations: 8558-2776 kcal/day        Estimated Protein Needs    Continue [x]  (from RD note on 4/15)  Adjusted Protein Recommendations:  79-94 gm/day        Estimated Fluid Needs        Continue [x]  (from RD note on 4/15)  Adjusted Fluid Recommendations:  1 mL : 1 kcal     Nutrient Intake  -not meeting estimated kcal/protein needs    Nutrition Intervention  1. meals and snacks   2. medical food supplements     Reccs:  1. Continue Glucerna Q12H. Can increase to Q8H if pt begins to consume both supplements per day.  2. Continue Beneprotein Q8H.  3. Continue diet order per SLP.   4. Consider appetite stimulant medication if poor appetite persists.      Goal/Expected Outcome:  -Pt to consume/tolerate >25% meals, snacks and medical food supplements x 3 days.     Indicator/Monitoring:  RD will monitor diet order, energy intake, body composition (wt), NFPF (appetite, skin integrity)

## 2018-04-18 NOTE — PROGRESS NOTE ADULT - SUBJECTIVE AND OBJECTIVE BOX
SUBJECTIVE: Patient is a 89y old  Female who presents with a chief complaint of syncope. Today is hospital day 6d. Patient seen and examined. She is having these periods where she is hearing sound that are disturbing her peace. When asked to explain she got very angry that I did not here the sounds nor did anyone else in the vicinity. Patient was not speaking to me. Daughter explained that she had been angry since yesterday when she first started hearing these sounds and that this has been intermittent since yesterday.     PAST MEDICAL & SURGICAL HISTORY  PAST MEDICAL & SURGICAL HISTORY:  MI (myocardial infarction)  TIA (transient ischemic attack)  Hernia  DVT (deep venous thrombosis)  Arthritis  Hypothyroid  DM (diabetes mellitus)  History of appendectomy  H/O cornea transplant  H/O hysterectomy for benign disease  ·	History of lumpectomy of right breast    ALLERGIES:  Bactrim (Unknown)  Cipro (Unknown)  fluoroquinolone antibiotics (Other (Severe))  Levaquin (Other)  Macrobid (Unknown)  methenamine (Unknown)  nitrofurantoin (Unknown)  penicillin (Unknown)  sulfa drugs (Unknown)    MEDICATIONS:  STANDING MEDICATIONS  acetaminophen   Tablet. 1000 milliGRAM(s) Oral two times a day  calcium carbonate 1250 mG + Vitamin D (OsCal 500 + D) 1 Tablet(s) Oral daily  cefepime  IVPB      cefepime  IVPB 1000 milliGRAM(s) IV Intermittent every 12 hours  dextrose 5%. 1000 milliLiter(s) IV Continuous <Continuous>  dextrose 50% Injectable 12.5 Gram(s) IV Push once  dextrose 50% Injectable 25 Gram(s) IV Push once  dextrose 50% Injectable 25 Gram(s) IV Push once  docusate sodium 100 milliGRAM(s) Oral daily  gabapentin 200 milliGRAM(s) Oral two times a day  insulin glargine Injectable (LANTUS) 10 Unit(s) SubCutaneous at bedtime  insulin lispro (HumaLOG) corrective regimen sliding scale   SubCutaneous three times a day before meals  insulin lispro Injectable (HumaLOG) 3 Unit(s) SubCutaneous three times a day before meals  levothyroxine 25 MICROGram(s) Oral daily  senna 2 Tablet(s) Oral at bedtime    PRN MEDICATIONS  dextrose Gel 1 Dose(s) Oral once PRN  glucagon  Injectable 1 milliGRAM(s) IntraMuscular once PRN    VITALS:   T(F): 97.3  HR: 94  BP: 103/51  RR: 18  SpO2: --    LABS:             10.9   6.23  )-----------( 347      ( 17 Apr 2018 09:03 )             34.0   04-17  135  |  96<L>  |  5<L>  ----------------------------<  87  3.9   |  29  |  <0.5<L>  Ca    8.0<L>      17 Apr 2018 09:03  Mg     1.7     04-17  PT/INR - ( 17 Apr 2018 09:03 )   PT: 16.20 sec;   INR: 1.49 ratio    PTT - ( 17 Apr 2018 09:03 )  PTT:30.5 sec    PHYSICAL EXAM:  GEN: NAD  LUNGS: Clear to auscultation bilaterally   HEART: S1/S2 present. RRR.   ABD: Soft, non-tender, non-distended. Bowel sounds present  EXT: No b/l LE edema

## 2018-04-18 NOTE — PROGRESS NOTE ADULT - ASSESSMENT
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 Hospital course complicated by UTI.     IMPRESSION:    1. Syncope: likely vasovagal, triggered with severe cervical pain.   Work up is negative, Head CT, EEG, EKG and echo.   neurology follow up.     2. Complicated Urinary tract infection:   urine culture grew Pseudomonas  Aeruginosa sensitive to cefepime.   continue Cefepime for now total of 7 day duration (Start Date: 4/14/18, End Date: 4/20/18)    3. DVT  - Continue to Monitor INR and dose coumadin accordingly - Home dosage is 2.5 mg PO    # Hypothyroid  - Continue with synthroid    # DM: Insulin therapy      # Dispo: Home with OP services. As per Physiatry notes, patients family refused SNF.

## 2018-04-18 NOTE — PROGRESS NOTE ADULT - ASSESSMENT
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 Hospital course complicated by UTI.     IMPRESSION:    # Syncope  - CT Head: In comparison with the prior noncontrast CT scan of the brain dated  February 2, 2019. Stable examination. No acute intracranial hemorrhage.  - 2d echo EF NL, grade 1 DD   - Neurology following  - EEG Normal    # Urinary tract infection with MDR Pesudomonas  - Outside laboratory result provided which showed Pseudomonas sensitive to Cefepime.  - ID recommendations noted and appreciated: Will continue Cefepime for now total of 7 day duration (Start Date: 4/14/18, End Date: 4/20/18)    # DVT  - Continue to Monitor INR and dose coumadin accordingly - Home dosage is 2.5 mg PO    # Hypothyroid  - Continue with synthroid    # DM: Insulin therapy    # Temporal arteritis: prednisone course completed    # Dispo: Home with OP services. As per Physiatry notes, patients family refused SNF.    D/C planning

## 2018-04-18 NOTE — PROGRESS NOTE ADULT - SUBJECTIVE AND OBJECTIVE BOX
RACIEL MILLIGAN  89y  Female      Patient is a 89y old  Female who presents with a chief complaint of     INTERVAL HPI/OVERNIGHT EVENTS:  no acute events, still with LE pain.     Vital Signs Last 24 Hrs  T(C): 36.3 (18 Apr 2018 14:06), Max: 36.3 (17 Apr 2018 19:43)  T(F): 97.3 (18 Apr 2018 14:06), Max: 97.3 (17 Apr 2018 19:43)  HR: 92 (18 Apr 2018 14:06) (92 - 94)  BP: 110/58 (18 Apr 2018 14:06) (103/51 - 110/58)  BP(mean): --  RR: 18 (18 Apr 2018 14:06) (18 - 18)  SpO2: --            Consultant(s) Notes Reviewed:  [x ] YES  [ ] NO          MEDICATIONS  (STANDING):  acetaminophen   Tablet. 1000 milliGRAM(s) Oral two times a day  calcium carbonate 1250 mG + Vitamin D (OsCal 500 + D) 1 Tablet(s) Oral daily  cefepime  IVPB      cefepime  IVPB 1000 milliGRAM(s) IV Intermittent every 12 hours  dextrose 5%. 1000 milliLiter(s) (50 mL/Hr) IV Continuous <Continuous>  dextrose 50% Injectable 12.5 Gram(s) IV Push once  dextrose 50% Injectable 25 Gram(s) IV Push once  dextrose 50% Injectable 25 Gram(s) IV Push once  docusate sodium 100 milliGRAM(s) Oral daily  gabapentin 200 milliGRAM(s) Oral two times a day  insulin glargine Injectable (LANTUS) 10 Unit(s) SubCutaneous at bedtime  insulin lispro (HumaLOG) corrective regimen sliding scale   SubCutaneous three times a day before meals  insulin lispro Injectable (HumaLOG) 3 Unit(s) SubCutaneous three times a day before meals  levothyroxine 25 MICROGram(s) Oral daily  senna 2 Tablet(s) Oral at bedtime    MEDICATIONS  (PRN):  dextrose Gel 1 Dose(s) Oral once PRN Blood Glucose LESS THAN 70 milliGRAM(s)/deciliter  glucagon  Injectable 1 milliGRAM(s) IntraMuscular once PRN Glucose LESS THAN 70 milligrams/deciliter      LABS                          10.9   6.23  )-----------( 347      ( 17 Apr 2018 09:03 )             34.0     04-18    136  |  96<L>  |  6<L>  ----------------------------<  106<H>  3.5   |  29  |  <0.5<L>    Ca    7.6<L>      18 Apr 2018 11:30  Mg     2.0     04-18          PT/INR - ( 18 Apr 2018 11:30 )   PT: 22.00 sec;   INR: 2.01 ratio         PTT - ( 18 Apr 2018 11:30 )  PTT:32.2 sec  Lactate Trend        CAPILLARY BLOOD GLUCOSE  129 (18 Apr 2018 16:26)            RADIOLOGY & ADDITIONAL TESTS:    Imaging Personally Reviewed:  [ ] YES  [ ] NO    HEALTH ISSUES - PROBLEM Dx:  Temporal arteritis: Temporal arteritis  TIA (transient ischemic attack): TIA (transient ischemic attack)  DM (diabetes mellitus): DM (diabetes mellitus)  Hypothyroid: Hypothyroid  DVT (deep venous thrombosis): DVT (deep venous thrombosis)  Urinary tract infection: Urinary tract infection  Syncope: Syncope        PHYSICAL EXAM:  Patient is bedbound, no distress, alert, oriented to place and persons.   HEENT: pale, non-icteric sclera,   Neck: supple, no JVD.   LUNGS: Clear to auscultation bilaterally   HEART: RRR S1 S2/   ABD: Soft, non-tender, non-distended. Bowel sounds present  EXT: no edema pulse is ok b/l.

## 2018-04-19 RX ORDER — GABAPENTIN 400 MG/1
2 CAPSULE ORAL
Qty: 0 | Refills: 0 | COMMUNITY
Start: 2018-04-19

## 2018-04-19 RX ORDER — WARFARIN SODIUM 2.5 MG/1
2.5 TABLET ORAL ONCE
Qty: 0 | Refills: 0 | Status: COMPLETED | OUTPATIENT
Start: 2018-04-19 | End: 2018-04-19

## 2018-04-19 RX ORDER — CHLORPROPAMIDE 250 MG
50 TABLET ORAL
Qty: 0 | Refills: 0 | COMMUNITY

## 2018-04-19 RX ADMIN — CEFEPIME 100 MILLIGRAM(S): 1 INJECTION, POWDER, FOR SOLUTION INTRAMUSCULAR; INTRAVENOUS at 17:40

## 2018-04-19 RX ADMIN — GABAPENTIN 200 MILLIGRAM(S): 400 CAPSULE ORAL at 06:24

## 2018-04-19 RX ADMIN — CEFEPIME 100 MILLIGRAM(S): 1 INJECTION, POWDER, FOR SOLUTION INTRAMUSCULAR; INTRAVENOUS at 06:24

## 2018-04-19 RX ADMIN — Medication 25 MICROGRAM(S): at 06:24

## 2018-04-19 RX ADMIN — WARFARIN SODIUM 2.5 MILLIGRAM(S): 2.5 TABLET ORAL at 21:55

## 2018-04-19 NOTE — PROGRESS NOTE ADULT - ASSESSMENT
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 Hospital course complicated by UTI.     A/P"     1. Syncope: likely vasovagal, triggered with severe cervical pain.   Work up is negative, Head CT, EEG, EKG and echo.   neurology follow up.     2. Complicated Urinary tract infection:   urine culture grew Pseudomonas  Aeruginosa sensitive to cefepime.   continue Cefepime fo4 7 days (Start Date: 4/14/18, End Date: 4/20/18)    3. Delirium: likely from UTI and hospital stay.   Bedside behaviour management, avoid sedative, opioids.     4. DVT  INR therapeutic, continue Coumadin Home dosage is 2.5 mg PO    5. Hypothyroid  Continue with synthroid    6.  DM: Insulin therapy      Disposition:  Home with OP services. As per Physiatry notes, patients family refused SNF.

## 2018-04-19 NOTE — PHYSICAL THERAPY INITIAL EVALUATION ADULT - PATIENT/FAMILY/SIGNIFICANT OTHER GOALS STATEMENT, PT EVAL
Family refuses PT despite PT attempt to speak to pt directly. Although pt did speak up at one point, agitated that they were sticking her (for blood is PTs assumption). PT was not allowed to attempt to encourage pt to get up bc dtg would not allow this. Nurse Jo aware. PT will not follow up, this is the 3rd attempt and 3rd refusal by family.

## 2018-04-19 NOTE — PROGRESS NOTE ADULT - ASSESSMENT
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 Hospital course complicated by UTI.     IMPRESSION:    # Syncope  - CT Head: In comparison with the prior noncontrast CT scan of the brain dated  February 2, 2019. Stable examination. No acute intracranial hemorrhage.  - 2d echo EF NL, grade 1 DD   - Neurology following  - EEG Normal    # Urinary tract infection with MDR Pesudomonas  - Outside laboratory result provided which showed Pseudomonas sensitive to Cefepime.  - ID recommendations noted and appreciated: Will continue Cefepime for now total of 7 day duration (Start Date: 4/14/18, End Date: 4/20/18)    # DVT: Continue to Monitor INR and dose coumadin accordingly - Home dosage is 2.5 mg PO    # Hypothyroid: Continue with synthroid    # DM: Insulin therapy    # Temporal arteritis: prednisone course completed    # Dispo: Home with OP services. As per Physiatry notes, patients family refused SNF.    D/C planning for 4/20./18

## 2018-04-19 NOTE — PHYSICAL THERAPY INITIAL EVALUATION ADULT - SPECIFY REASON(S)
PT f/u at RN/DTR request however daughter thoroughly explained that pt is not ready for b/s PT today.
Pt adamantly refusing participating in b/s PT at this time due to fear of falling, does not appear receptive to PT education. Pt asked to notify NSG/PT if she changes her mind.
Activity order updated by Dr. Biggs, pt currently with vEEG. Will f/u as appropriate.
Pt is currently on bedrest. Attempted to reach Dr. Fair ([id number]). Will f/u with PT.
Spoke with Dr. Fair ([id number]) for OOB activity order, but pt still on bedrest. Will f/u as appropriate.
When entering room, dtg proceeded to get up physically blocking PT from going to pt. She reports pt cannot participate in PT bc pt is agitated. PT attempted to speak to pt directly but dtg didnt allow

## 2018-04-19 NOTE — PROGRESS NOTE ADULT - SUBJECTIVE AND OBJECTIVE BOX
SRINIHYUNY  89y  Female      Patient is a 89y old  Female who presents with a chief complaint of presented after witnessed syncopal episode with shaking (16 Apr 2018 09:12)      INTERVAL HPI/OVERNIGHT EVENTS:  she is delirious, hallucinating, no fever.     Vital Signs Last 24 Hrs  T(C): 35.9 (19 Apr 2018 06:39), Max: 35.9 (19 Apr 2018 06:39)  T(F): 96.6 (19 Apr 2018 06:39), Max: 96.6 (19 Apr 2018 06:39)  HR: 91 (19 Apr 2018 06:39) (91 - 91)  BP: 112/54 (19 Apr 2018 06:39) (112/54 - 112/54)  BP(mean): --  RR: 18 (19 Apr 2018 06:39) (18 - 18)  SpO2: --            Consultant(s) Notes Reviewed:  [x ] YES  [ ] NO          MEDICATIONS  (STANDING):  acetaminophen   Tablet. 1000 milliGRAM(s) Oral two times a day  calcium carbonate 1250 mG + Vitamin D (OsCal 500 + D) 1 Tablet(s) Oral daily  cefepime  IVPB      cefepime  IVPB 1000 milliGRAM(s) IV Intermittent every 12 hours  dextrose 5%. 1000 milliLiter(s) (50 mL/Hr) IV Continuous <Continuous>  dextrose 50% Injectable 12.5 Gram(s) IV Push once  dextrose 50% Injectable 25 Gram(s) IV Push once  dextrose 50% Injectable 25 Gram(s) IV Push once  docusate sodium 100 milliGRAM(s) Oral daily  gabapentin 200 milliGRAM(s) Oral two times a day  insulin glargine Injectable (LANTUS) 10 Unit(s) SubCutaneous at bedtime  insulin lispro (HumaLOG) corrective regimen sliding scale   SubCutaneous three times a day before meals  insulin lispro Injectable (HumaLOG) 3 Unit(s) SubCutaneous three times a day before meals  levothyroxine 25 MICROGram(s) Oral daily  senna 2 Tablet(s) Oral at bedtime  warfarin 2.5 milliGRAM(s) Oral once    MEDICATIONS  (PRN):  dextrose Gel 1 Dose(s) Oral once PRN Blood Glucose LESS THAN 70 milliGRAM(s)/deciliter  glucagon  Injectable 1 milliGRAM(s) IntraMuscular once PRN Glucose LESS THAN 70 milligrams/deciliter      LABS                          9.6    5.59  )-----------( 290      ( 18 Apr 2018 17:44 )             30.4     04-18    132<L>  |  96<L>  |  8<L>  ----------------------------<  112<H>  3.7   |  28  |  <0.5<L>    Ca    7.4<L>      18 Apr 2018 17:44  Mg     2.0     04-18          PT/INR - ( 18 Apr 2018 17:44 )   PT: 22.70 sec;   INR: 2.07 ratio         PTT - ( 18 Apr 2018 17:44 )  PTT:33.2 sec  Lactate Trend        CAPILLARY BLOOD GLUCOSE  93 (19 Apr 2018 06:50)            RADIOLOGY & ADDITIONAL TESTS:    Imaging Personally Reviewed:  [ ] YES  [ ] NO    HEALTH ISSUES - PROBLEM Dx:  Temporal arteritis: Temporal arteritis  TIA (transient ischemic attack): TIA (transient ischemic attack)  DM (diabetes mellitus): DM (diabetes mellitus)  Hypothyroid: Hypothyroid  DVT (deep venous thrombosis): DVT (deep venous thrombosis)  Urinary tract infection: Urinary tract infection  Syncope: Syncope        PHYSICAL EXAM:  Patient is bedbound, no distress, alert, oriented to place and persons.   HEENT: pale, non-icteric sclera,   Neck: supple, no JVD.   LUNGS: Clear to auscultation bilaterally   HEART: RRR S1 S2/   ABD: Soft, non-tender, non-distended. Bowel sounds present  EXT: no edema pulse is ok b/l.

## 2018-04-20 VITALS — SYSTOLIC BLOOD PRESSURE: 143 MMHG | HEART RATE: 112 BPM | DIASTOLIC BLOOD PRESSURE: 59 MMHG

## 2018-04-20 LAB
ANION GAP SERPL CALC-SCNC: 13 MMOL/L — SIGNIFICANT CHANGE UP (ref 7–14)
APTT BLD: 34.5 SEC — SIGNIFICANT CHANGE UP (ref 27–39.2)
BUN SERPL-MCNC: 8 MG/DL — LOW (ref 10–20)
CALCIUM SERPL-MCNC: 7.7 MG/DL — LOW (ref 8.5–10.1)
CHLORIDE SERPL-SCNC: 94 MMOL/L — LOW (ref 98–110)
CO2 SERPL-SCNC: 27 MMOL/L — SIGNIFICANT CHANGE UP (ref 17–32)
CREAT SERPL-MCNC: <0.5 MG/DL — LOW (ref 0.7–1.5)
GLUCOSE SERPL-MCNC: 83 MG/DL — SIGNIFICANT CHANGE UP (ref 70–99)
HCT VFR BLD CALC: 35.5 % — LOW (ref 37–47)
HGB BLD-MCNC: 11.3 G/DL — LOW (ref 12–16)
INR BLD: 2.55 RATIO — HIGH (ref 0.65–1.3)
MCHC RBC-ENTMCNC: 26.5 PG — LOW (ref 27–31)
MCHC RBC-ENTMCNC: 31.8 G/DL — LOW (ref 32–37)
MCV RBC AUTO: 83.1 FL — SIGNIFICANT CHANGE UP (ref 81–99)
NRBC # BLD: 0 /100 WBCS — SIGNIFICANT CHANGE UP (ref 0–0)
PLATELET # BLD AUTO: 328 K/UL — SIGNIFICANT CHANGE UP (ref 130–400)
POTASSIUM SERPL-MCNC: 3.9 MMOL/L — SIGNIFICANT CHANGE UP (ref 3.5–5)
POTASSIUM SERPL-SCNC: 3.9 MMOL/L — SIGNIFICANT CHANGE UP (ref 3.5–5)
PROTHROM AB SERPL-ACNC: 28.1 SEC — HIGH (ref 9.95–12.87)
RBC # BLD: 4.27 M/UL — SIGNIFICANT CHANGE UP (ref 4.2–5.4)
RBC # FLD: 19.9 % — HIGH (ref 11.5–14.5)
SODIUM SERPL-SCNC: 134 MMOL/L — LOW (ref 135–146)
WBC # BLD: 6.72 K/UL — SIGNIFICANT CHANGE UP (ref 4.8–10.8)
WBC # FLD AUTO: 6.72 K/UL — SIGNIFICANT CHANGE UP (ref 4.8–10.8)

## 2018-04-20 RX ORDER — ESTROGENS, CONJUGATED 0.625 MG/G
1 CREAM WITH APPLICATOR VAGINAL
Qty: 0 | Refills: 0 | COMMUNITY

## 2018-04-20 RX ORDER — ACETAMINOPHEN 500 MG
2 TABLET ORAL
Qty: 0 | Refills: 0 | COMMUNITY

## 2018-04-20 RX ADMIN — Medication 1 TABLET(S): at 15:59

## 2018-04-20 RX ADMIN — GABAPENTIN 200 MILLIGRAM(S): 400 CAPSULE ORAL at 06:29

## 2018-04-20 RX ADMIN — Medication 25 MICROGRAM(S): at 06:29

## 2018-04-20 RX ADMIN — CEFEPIME 100 MILLIGRAM(S): 1 INJECTION, POWDER, FOR SOLUTION INTRAMUSCULAR; INTRAVENOUS at 06:29

## 2018-04-20 NOTE — PROGRESS NOTE ADULT - PROVIDER SPECIALTY LIST ADULT
Hospitalist
Internal Medicine
Hospitalist
Internal Medicine
Neurology

## 2018-04-20 NOTE — PROGRESS NOTE ADULT - SUBJECTIVE AND OBJECTIVE BOX
RACIEL MILLIGAN  89y  Female      Patient is a 89y old  Female who presents with a chief complaint of presented after witnessed syncopal episode with shaking (16 Apr 2018 09:12)      INTERVAL HPI/OVERNIGHT EVENTS:  no events.   Vital Signs Last 24 Hrs  T(C): 37.2 (20 Apr 2018 13:47), Max: 37.2 (20 Apr 2018 13:47)  T(F): 99 (20 Apr 2018 13:47), Max: 99 (20 Apr 2018 13:47)  HR: 112 (20 Apr 2018 14:16) (97 - 112)  BP: 143/59 (20 Apr 2018 14:16) (97/54 - 143/59)  BP(mean): --  RR: --  SpO2: --            Consultant(s) Notes Reviewed:  [x ] YES  [ ] NO          MEDICATIONS  (STANDING):  acetaminophen   Tablet. 1000 milliGRAM(s) Oral two times a day  calcium carbonate 1250 mG + Vitamin D (OsCal 500 + D) 1 Tablet(s) Oral daily  cefepime  IVPB      cefepime  IVPB 1000 milliGRAM(s) IV Intermittent every 12 hours  dextrose 5%. 1000 milliLiter(s) (50 mL/Hr) IV Continuous <Continuous>  dextrose 50% Injectable 12.5 Gram(s) IV Push once  dextrose 50% Injectable 25 Gram(s) IV Push once  dextrose 50% Injectable 25 Gram(s) IV Push once  docusate sodium 100 milliGRAM(s) Oral daily  gabapentin 200 milliGRAM(s) Oral two times a day  levothyroxine 25 MICROGram(s) Oral daily  senna 2 Tablet(s) Oral at bedtime    MEDICATIONS  (PRN):  dextrose Gel 1 Dose(s) Oral once PRN Blood Glucose LESS THAN 70 milliGRAM(s)/deciliter  glucagon  Injectable 1 milliGRAM(s) IntraMuscular once PRN Glucose LESS THAN 70 milligrams/deciliter      LABS                          11.3   6.72  )-----------( 328      ( 20 Apr 2018 07:43 )             35.5     04-20    134<L>  |  94<L>  |  8<L>  ----------------------------<  83  3.9   |  27  |  <0.5<L>    Ca    7.7<L>      20 Apr 2018 07:43          PT/INR - ( 20 Apr 2018 07:43 )   PT: 28.10 sec;   INR: 2.55 ratio         PTT - ( 20 Apr 2018 07:43 )  PTT:34.5 sec  Lactate Trend        CAPILLARY BLOOD GLUCOSE  133 (20 Apr 2018 10:55)            RADIOLOGY & ADDITIONAL TESTS:    Imaging Personally Reviewed:  [ ] YES  [ ] NO    HEALTH ISSUES - PROBLEM Dx:  Temporal arteritis: Temporal arteritis  TIA (transient ischemic attack): TIA (transient ischemic attack)  DM (diabetes mellitus): DM (diabetes mellitus)  Hypothyroid: Hypothyroid  DVT (deep venous thrombosis): DVT (deep venous thrombosis)  Urinary tract infection: Urinary tract infection  Syncope: Syncope        PHYSICAL EXAM:  Patient is bedbound, no distress, alert, oriented to place and persons.   HEENT: pale, non-icteric sclera,   Neck: supple, no JVD.   LUNGS: Clear to auscultation bilaterally   HEART: RRR S1 S2/   ABD: Soft, non-tender, non-distended. Bowel sounds present  EXT: no edema pulse is ok b/l.

## 2018-04-20 NOTE — PROGRESS NOTE ADULT - ASSESSMENT
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 Hospital course complicated by UTI.     A/P"     1. Syncope: likely vasovagal.  Work up is negative, Head CT, EEG, EKG and echo.   neurology follow up.     2. Complicated Urinary tract infection:   urine culture grew Pseudomonas  Aeruginosa sensitive to cefepime.   She completed Cefepime for 7 days.    3. Delirium: likely from UTI and hospital stay.   Bedside behaviour management, avoid sedative, opioids.     4. DVT  INR therapeutic, continue Coumadin Home dosage is 2.5 mg PO    5. Hypothyroid  Continue with synthroid    6. Deconditioning:   patient is bedbound. She refused physical therapy.     7.  DM: Insulin therapy    Disposition:  Discharge Home today with OP services. As per Physiatry notes, patients family refused SNF.

## 2018-04-20 NOTE — CHART NOTE - NSCHARTNOTEFT_GEN_A_CORE
Registered Dietitian Follow-Up     Patient Profile Reviewed                           Yes [x]   No []     Nutrition History Previously Obtained        Yes [x]  No []       Pertinent Subjective Information:  -Spoke w. pt daughter at bedside as pt disoriented. Reports pt. refused breakfast today but drank Glucerna. Reports Beneprotein Usual PO intake ~15% but recently pt refusing meals more frequently. Daughter reports pt has disinterest in eating and ?likely d/t being in hospital setting or d/t food preferences. Food preferences discussed at previous follow up in place but pt with different taste aversions daily causing PO intake to frequently vary. Discussed option of appetite stimulant medication and pt daughter deferred at this time. ?need for alternate means of nutrition as PO intake worsening as opposed to improving. Will assess need upon f/u. No new nutrition interventions at this time.      Pertinent Medical Interventions:  Urinary tract infection with MDR Pesudomonas  -day 7 of cefepime     Dispo: Medically cleared for d/c as course of abx complete.  Home with OP services. As per Physiatry notes, patients family refused SNF.     Diet order: Dysphagia 1 Pureed, thin liquids + Glucerna BID and Beneprotein TID     Anthropometrics:  - Ht.  - Wt.: 62.9kg- no new wt  - %wt change  - BMI  - IBW     Pertinent Lab Data: (4/20/18)  -H/H 11.3/35.5  -Na 134  -Cl 94  -BUN 8  -Ca 7.7     Pertinent Meds: abx, colace, senna, OsCal, synthroid      Physical Findings:  - Appearance: alert, disoriented  - GI function: +LBM 4/18 slight constipation   - Tubes:  - Oral/Mouth cavity: edentulous, daughter reports dentures are ill fitting and has not gotten resized. Requires pureed fo ease of chewing not decreased swallowing ability.   - Skin: stage II pressure ulcer sacral spine and L buttocks      Nutrition Requirements  Weight Used: 62.9kg      Estimated Energy Needs    Continue [x] (from RD note on 4/15)  Adjusted Energy Recommendations: 7970-9512 kcal/day        Estimated Protein Needs    Continue [x]  (from RD note on 4/15)  Adjusted Protein Recommendations:  79-94 gm/day        Estimated Fluid Needs        Continue [x]  (from RD note on 4/15)  Adjusted Fluid Recommendations:  1 mL : 1 kcal     Nutrient Intake  -not meeting estimated kcal/protein needs    Nutrition Intervention  1. meals and snacks   2. medical food supplements     Reccs:  1. Continue Glucerna Q12H. Can increase to Q8H if pt begins to consume both supplements per day.  2. Continue Beneprotein Q8H.  3. Continue diet order per SLP.      Goal/Expected Outcome:  -Pt to consume/tolerate >25% meals, snacks and medical food supplements x4 days.     Indicator/Monitoring:  RD will monitor diet order, energy intake, body composition (wt), NFPF (appetite, skin integrity).

## 2018-04-23 ENCOUNTER — APPOINTMENT (OUTPATIENT)
Dept: GERIATRICS | Facility: HOSPITAL | Age: 83
End: 2018-04-23

## 2018-04-27 DIAGNOSIS — Z88.0 ALLERGY STATUS TO PENICILLIN: ICD-10-CM

## 2018-04-27 DIAGNOSIS — E03.9 HYPOTHYROIDISM, UNSPECIFIED: ICD-10-CM

## 2018-04-27 DIAGNOSIS — Z79.4 LONG TERM (CURRENT) USE OF INSULIN: ICD-10-CM

## 2018-04-27 DIAGNOSIS — I82.91 CHRONIC EMBOLISM AND THROMBOSIS OF UNSPECIFIED VEIN: ICD-10-CM

## 2018-04-27 DIAGNOSIS — M54.2 CERVICALGIA: ICD-10-CM

## 2018-04-27 DIAGNOSIS — R55 SYNCOPE AND COLLAPSE: ICD-10-CM

## 2018-04-27 DIAGNOSIS — Z88.1 ALLERGY STATUS TO OTHER ANTIBIOTIC AGENTS STATUS: ICD-10-CM

## 2018-04-27 DIAGNOSIS — Z86.79 PERSONAL HISTORY OF OTHER DISEASES OF THE CIRCULATORY SYSTEM: ICD-10-CM

## 2018-04-27 DIAGNOSIS — M19.90 UNSPECIFIED OSTEOARTHRITIS, UNSPECIFIED SITE: ICD-10-CM

## 2018-04-27 DIAGNOSIS — R79.1 ABNORMAL COAGULATION PROFILE: ICD-10-CM

## 2018-04-27 DIAGNOSIS — Z94.7 CORNEAL TRANSPLANT STATUS: ICD-10-CM

## 2018-04-27 DIAGNOSIS — M31.6 OTHER GIANT CELL ARTERITIS: ICD-10-CM

## 2018-04-27 DIAGNOSIS — K57.90 DIVERTICULOSIS OF INTESTINE, PART UNSPECIFIED, WITHOUT PERFORATION OR ABSCESS WITHOUT BLEEDING: ICD-10-CM

## 2018-04-27 DIAGNOSIS — R42 DIZZINESS AND GIDDINESS: ICD-10-CM

## 2018-04-27 DIAGNOSIS — Z88.2 ALLERGY STATUS TO SULFONAMIDES: ICD-10-CM

## 2018-04-27 DIAGNOSIS — Z87.440 PERSONAL HISTORY OF URINARY (TRACT) INFECTIONS: ICD-10-CM

## 2018-04-27 DIAGNOSIS — Z90.710 ACQUIRED ABSENCE OF BOTH CERVIX AND UTERUS: ICD-10-CM

## 2018-04-27 DIAGNOSIS — I25.2 OLD MYOCARDIAL INFARCTION: ICD-10-CM

## 2018-04-27 DIAGNOSIS — N39.0 URINARY TRACT INFECTION, SITE NOT SPECIFIED: ICD-10-CM

## 2018-04-27 DIAGNOSIS — I25.10 ATHEROSCLEROTIC HEART DISEASE OF NATIVE CORONARY ARTERY WITHOUT ANGINA PECTORIS: ICD-10-CM

## 2018-04-27 DIAGNOSIS — Z85.3 PERSONAL HISTORY OF MALIGNANT NEOPLASM OF BREAST: ICD-10-CM

## 2018-04-27 DIAGNOSIS — E11.40 TYPE 2 DIABETES MELLITUS WITH DIABETIC NEUROPATHY, UNSPECIFIED: ICD-10-CM

## 2018-04-27 DIAGNOSIS — R41.0 DISORIENTATION, UNSPECIFIED: ICD-10-CM

## 2018-04-27 DIAGNOSIS — Z86.73 PERSONAL HISTORY OF TRANSIENT ISCHEMIC ATTACK (TIA), AND CEREBRAL INFARCTION WITHOUT RESIDUAL DEFICITS: ICD-10-CM

## 2018-04-27 DIAGNOSIS — R53.1 WEAKNESS: ICD-10-CM

## 2018-04-27 DIAGNOSIS — B96.5 PSEUDOMONAS (AERUGINOSA) (MALLEI) (PSEUDOMALLEI) AS THE CAUSE OF DISEASES CLASSIFIED ELSEWHERE: ICD-10-CM

## 2018-04-27 DIAGNOSIS — R32 UNSPECIFIED URINARY INCONTINENCE: ICD-10-CM

## 2018-04-27 DIAGNOSIS — Z79.01 LONG TERM (CURRENT) USE OF ANTICOAGULANTS: ICD-10-CM

## 2019-09-03 NOTE — ED ADULT TRIAGE NOTE - CHIEF COMPLAINT QUOTE
witnessed syncopal episode lasting less than 5 minutes. Recent UTI  in field
no dyspnea on exertion/no peripheral edema/no claudication/no palpitations/no orthopnea/no chest pain/no paroxysmal nocturnal dyspnea

## 2020-08-16 NOTE — PROGRESS NOTE ADULT - PROBLEM SELECTOR PLAN 3
Likely secondary to Warfarin toxicity. Resolved after transfusion. Hemodynamically stable. Coumadin 2.5mg tonight. 190

## 2021-05-06 NOTE — PROGRESS NOTE ADULT - PROBLEM SELECTOR PLAN 3
Likely secondary to Warfarin toxicity. Resolved after transfusion. Hemodynamically stable. Coumadin 2.5mg tonight. 70

## 2021-07-11 NOTE — DISCHARGE NOTE ADULT - WARFARIN/COUMADIN - POTENTIAL FOR ADVERSE DRUG REACTIONS AND INTERACTIONS
General: non-toxic, NAD  HEENT: NCAT, PERRL  Cardiac: RRR, no murmurs, 2+ peripheral pulses  Chest: CTAB  Abdomen: soft, non-distended, bowel sounds present, no ttp, no rebound or guarding  Extremities: Slight swelling of the LLE, Tenderness to palpation of the L lower thigh. Pain with passive motion.   Skin: Ecchymosis appreciated around the L knee.  Neuro: AAOx4, 5+motor, sensory grossly intact  Psych: mood and affect appropriate Statement Selected

## 2021-09-25 NOTE — PROGRESS NOTE ADULT - SUBJECTIVE AND OBJECTIVE BOX
SUBJECTIVE:Patient is a 89y old  Female who presents with a chief complaint of . Today is hospital day 4d     PAST MEDICAL & SURGICAL HISTORY  PAST MEDICAL & SURGICAL HISTORY:  MI (myocardial infarction)  TIA (transient ischemic attack)  Hernia  DVT (deep venous thrombosis)  Arthritis  Hypothyroid  DM (diabetes mellitus)  History of appendectomy  H/O cornea transplant  H/O hysterectomy for benign disease  History of lumpectomy of right breast    ALLERGIES:  Bactrim (Unknown)  Cipro (Unknown)  fluoroquinolone antibiotics (Other (Severe))  Levaquin (Other)  Macrobid (Unknown)  methenamine (Unknown)  nitrofurantoin (Unknown)  penicillin (Unknown)  sulfa drugs (Unknown)    MEDICATIONS:  STANDING MEDICATIONS  acetaminophen   Tablet. 1000 milliGRAM(s) Oral two times a day  calcium carbonate 1250 mG + Vitamin D (OsCal 500 + D) 1 Tablet(s) Oral daily  cefepime  IVPB      cefepime  IVPB 1000 milliGRAM(s) IV Intermittent every 12 hours  dextrose 5%. 1000 milliLiter(s) IV Continuous <Continuous>  dextrose 50% Injectable 12.5 Gram(s) IV Push once  dextrose 50% Injectable 25 Gram(s) IV Push once  dextrose 50% Injectable 25 Gram(s) IV Push once  gabapentin 200 milliGRAM(s) Oral daily  insulin glargine Injectable (LANTUS) 10 Unit(s) SubCutaneous at bedtime  insulin lispro (HumaLOG) corrective regimen sliding scale   SubCutaneous three times a day before meals  insulin lispro Injectable (HumaLOG) 3 Unit(s) SubCutaneous three times a day before meals  levothyroxine 25 MICROGram(s) Oral daily  potassium chloride    Tablet ER 20 milliEquivalent(s) Oral once  warfarin 2.5 milliGRAM(s) Oral once    PRN MEDICATIONS  dextrose Gel 1 Dose(s) Oral once PRN  glucagon  Injectable 1 milliGRAM(s) IntraMuscular once PRN    VITALS:   T(F): 97.4  HR: 91  BP: 107/58  RR: 20  SpO2: 93%    LABS:                        10.3   4.80  )-----------( 299      ( 16 Apr 2018 04:53 )             32.7     04-16    137  |  99  |  6<L>  ----------------------------<  83  3.4<L>   |  28  |  <0.5<L>    Ca    7.7<L>      16 Apr 2018 04:53    TPro  5.0<L>  /  Alb  2.5<L>  /  TBili  0.5  /  DBili  x   /  AST  18  /  ALT  5   /  AlkPhos  61  04-15    PT/INR - ( 16 Apr 2018 04:53 )   PT: 19.20 sec;   INR: 1.76 ratio       PTT - ( 16 Apr 2018 04:53 )  PTT:32.9 sec    PHYSICAL EXAM:  GEN:   LUNGS: Clear to auscultation bilaterally   HEART: S1/S2 present. RRR.   ABD: Soft, non-tender, non-distended. Bowel sounds present  EXT: SUBJECTIVE: Patient is a 89y old  Female who presents with a chief complaint of . Today is hospital day 4d. Patient layin ing bed in No acute distress. Conversing in a logical manner. Patient in no acute distress, No acute events overnight. Will continue Cefepime. ID pending recommendations however evaluated by ID service.    PAST MEDICAL & SURGICAL HISTORY  PAST MEDICAL & SURGICAL HISTORY:  MI (myocardial infarction)  TIA (transient ischemic attack)  Hernia  DVT (deep venous thrombosis)  Arthritis  Hypothyroid  DM (diabetes mellitus)  History of appendectomy  H/O cornea transplant  H/O hysterectomy for benign disease  History of lumpectomy of right breast    ALLERGIES:  Bactrim (Unknown)  Cipro (Unknown)  fluoroquinolone antibiotics (Other (Severe))  Levaquin (Other)  Macrobid (Unknown)  methenamine (Unknown)  nitrofurantoin (Unknown)  penicillin (Unknown)  sulfa drugs (Unknown)    MEDICATIONS:  STANDING MEDICATIONS  acetaminophen   Tablet. 1000 milliGRAM(s) Oral two times a day  calcium carbonate 1250 mG + Vitamin D (OsCal 500 + D) 1 Tablet(s) Oral daily  cefepime  IVPB      cefepime  IVPB 1000 milliGRAM(s) IV Intermittent every 12 hours  dextrose 5%. 1000 milliLiter(s) IV Continuous <Continuous>  dextrose 50% Injectable 12.5 Gram(s) IV Push once  dextrose 50% Injectable 25 Gram(s) IV Push once  dextrose 50% Injectable 25 Gram(s) IV Push once  gabapentin 200 milliGRAM(s) Oral daily  insulin glargine Injectable (LANTUS) 10 Unit(s) SubCutaneous at bedtime  insulin lispro (HumaLOG) corrective regimen sliding scale   SubCutaneous three times a day before meals  insulin lispro Injectable (HumaLOG) 3 Unit(s) SubCutaneous three times a day before meals  levothyroxine 25 MICROGram(s) Oral daily  potassium chloride    Tablet ER 20 milliEquivalent(s) Oral once  warfarin 2.5 milliGRAM(s) Oral once    PRN MEDICATIONS  dextrose Gel 1 Dose(s) Oral once PRN  glucagon  Injectable 1 milliGRAM(s) IntraMuscular once PRN    VITALS:   T(F): 97.4  HR: 91  BP: 107/58  RR: 20  SpO2: 93%    LABS:                        10.3   4.80  )-----------( 299      ( 16 Apr 2018 04:53 )             32.7     04-16    137  |  99  |  6<L>  ----------------------------<  83  3.4<L>   |  28  |  <0.5<L>    Ca    7.7<L>      16 Apr 2018 04:53    TPro  5.0<L>  /  Alb  2.5<L>  /  TBili  0.5  /  DBili  x   /  AST  18  /  ALT  5   /  AlkPhos  61  04-15    PT/INR - ( 16 Apr 2018 04:53 )   PT: 19.20 sec;   INR: 1.76 ratio       PTT - ( 16 Apr 2018 04:53 )  PTT:32.9 sec Opt out

## 2022-08-22 NOTE — DISCHARGE NOTE ADULT - PATIENT PORTAL LINK FT
You can access the [facility], offered by [facility], by registering with the following website: [email or web address] [Follow-Up] : a follow-up visit [Shortness of Breath] : shortness of Breath [Asthma] : asthma

## 2022-10-11 NOTE — DIETITIAN INITIAL EVALUATION ADULT. - ADHERENCE
[facility] - Pain Management  Discharge Note  Short Stay    Procedure(s) (LRB):  Block-nerve-medial branch-lumbar, bilateral L4 through S1 (Bilateral)      OUTCOME: Patient tolerated treatment/procedure well without complication and is now ready for discharge.    DISPOSITION: Home or Self Care    FINAL DIAGNOSIS:  Lumbosacral spondylosis    FOLLOWUP: In clinic    DISCHARGE INSTRUCTIONS:  See nurse's notes     TIME SPENT ON DISCHARGE: 5 minutes  
pt follows diabetic diet and watches foods high in vitamin K (pt on coumadin) per pt daughter